# Patient Record
Sex: MALE | Race: WHITE | Employment: PART TIME | URBAN - METROPOLITAN AREA
[De-identification: names, ages, dates, MRNs, and addresses within clinical notes are randomized per-mention and may not be internally consistent; named-entity substitution may affect disease eponyms.]

---

## 2017-08-09 PROBLEM — M65.341 TRIGGER RING FINGER OF RIGHT HAND: Status: ACTIVE | Noted: 2017-08-09

## 2018-07-30 ENCOUNTER — OFFICE VISIT (OUTPATIENT)
Dept: FAMILY MEDICINE CLINIC | Age: 58
End: 2018-07-30
Payer: COMMERCIAL

## 2018-07-30 ENCOUNTER — HOSPITAL ENCOUNTER (OUTPATIENT)
Dept: GENERAL RADIOLOGY | Age: 58
Discharge: HOME | End: 2018-08-01
Payer: COMMERCIAL

## 2018-07-30 VITALS
HEIGHT: 66 IN | SYSTOLIC BLOOD PRESSURE: 138 MMHG | TEMPERATURE: 98.5 F | OXYGEN SATURATION: 98 % | DIASTOLIC BLOOD PRESSURE: 82 MMHG | RESPIRATION RATE: 16 BRPM | HEART RATE: 96 BPM | BODY MASS INDEX: 29.57 KG/M2 | WEIGHT: 184 LBS

## 2018-07-30 DIAGNOSIS — L03.011 CELLULITIS OF FINGER OF RIGHT HAND: ICD-10-CM

## 2018-07-30 DIAGNOSIS — L03.011 CELLULITIS OF FINGER OF RIGHT HAND: Primary | ICD-10-CM

## 2018-07-30 DIAGNOSIS — Z12.11 ENCOUNTER FOR SCREENING COLONOSCOPY: ICD-10-CM

## 2018-07-30 PROCEDURE — G8419 CALC BMI OUT NRM PARAM NOF/U: HCPCS | Performed by: NURSE PRACTITIONER

## 2018-07-30 PROCEDURE — 99213 OFFICE O/P EST LOW 20 MIN: CPT | Performed by: NURSE PRACTITIONER

## 2018-07-30 PROCEDURE — 3017F COLORECTAL CA SCREEN DOC REV: CPT | Performed by: NURSE PRACTITIONER

## 2018-07-30 PROCEDURE — 73140 X-RAY EXAM OF FINGER(S): CPT

## 2018-07-30 PROCEDURE — 1036F TOBACCO NON-USER: CPT | Performed by: NURSE PRACTITIONER

## 2018-07-30 PROCEDURE — G8427 DOCREV CUR MEDS BY ELIG CLIN: HCPCS | Performed by: NURSE PRACTITIONER

## 2018-07-30 RX ORDER — SULFAMETHOXAZOLE AND TRIMETHOPRIM 800; 160 MG/1; MG/1
1 TABLET ORAL 2 TIMES DAILY
Qty: 20 TABLET | Refills: 0 | Status: SHIPPED | OUTPATIENT
Start: 2018-07-30 | End: 2018-08-09

## 2018-07-30 ASSESSMENT — ENCOUNTER SYMPTOMS
CONSTIPATION: 1
VOMITING: 0
WHEEZING: 0
NAUSEA: 0
COUGH: 0
DIARRHEA: 1
SHORTNESS OF BREATH: 0

## 2018-07-30 NOTE — PROGRESS NOTES
HPI:  Patient comes in today for   Chief Complaint   Patient presents with    Finger Pain     right first finger swollen, and red started friday pt is unaware of cause     Referral - General     pt is overdue for colonoscopy would like referred to gastro    . Complains of swelling, redness and pain along cuticle of right index finger. Denies injury. He has not soaked his finger or put any OTC topicals on the area. Symptoms are getting worse. He has had colonoscopy but states it was many years ago and he does not remember who did his colonoscopy. Prior to Visit Medications    Medication Sig Taking? Authorizing Provider   sulfamethoxazole-trimethoprim (BACTRIM DS) 800-160 MG per tablet Take 1 tablet by mouth 2 times daily for 10 days Yes Landen Co, APRN - CNP         No Known Allergies      Review of Systems  Review of Systems   Constitutional: Negative for malaise/fatigue and weight loss. Respiratory: Negative for cough, shortness of breath and wheezing. Cardiovascular: Negative for chest pain and palpitations. Gastrointestinal: Positive for constipation and diarrhea. Negative for nausea and vomiting. Musculoskeletal: Positive for joint pain. Neurological: Negative for dizziness, weakness and headaches. VS:  /82   Pulse 96   Temp 98.5 °F (36.9 °C) (Oral)   Resp 16   Ht 5' 6\" (1.676 m)   Wt 184 lb (83.5 kg)   SpO2 98%   BMI 29.70 kg/m²     Physical Exam  Physical Exam   Constitutional: He is oriented to person, place, and time. He appears well-developed and well-nourished. No distress. HENT:   Head: Normocephalic and atraumatic. Neck: No tracheal deviation present. No thyromegaly present. Cardiovascular: Normal rate, regular rhythm and intact distal pulses. No murmur heard. Pulmonary/Chest: Effort normal and breath sounds normal. He has no wheezes. He has no rales. He exhibits no tenderness. Abdominal: Soft. Bowel sounds are normal. There is no tenderness. Musculoskeletal: He exhibits tenderness. Right index finger redness, swelling and tenderness along cuticle. Lymphadenopathy:     He has no cervical adenopathy. Neurological: He is alert and oriented to person, place, and time. Skin: Skin is warm and dry. Psychiatric: He has a normal mood and affect. His behavior is normal.         Assessment/Plan:  Bebe Hamman was seen today for finger pain and referral - general.    Diagnoses and all orders for this visit:    Cellulitis of finger of right hand  -     sulfamethoxazole-trimethoprim (BACTRIM DS) 800-160 MG per tablet; Take 1 tablet by mouth 2 times daily for 10 days  -     XR FINGER RIGHT (MIN 2 VIEWS);  Future  -epsom salt soaks  -contact office if symptoms do not improve within 48 hours or completely resolve within 2 weeks    Encounter for screening colonoscopy  -     External Referral To Gastroenterology          Greater than 15  Minutes was spent with patient and more than 50% of the time was spent face to face counseling and educating regarding diagnoses

## 2018-12-13 ENCOUNTER — TELEPHONE (OUTPATIENT)
Dept: FAMILY MEDICINE CLINIC | Age: 58
End: 2018-12-13

## 2020-05-19 ENCOUNTER — OFFICE VISIT (OUTPATIENT)
Dept: FAMILY MEDICINE CLINIC | Age: 60
End: 2020-05-19
Payer: COMMERCIAL

## 2020-05-19 VITALS
HEART RATE: 90 BPM | DIASTOLIC BLOOD PRESSURE: 86 MMHG | HEIGHT: 66 IN | BODY MASS INDEX: 28.28 KG/M2 | SYSTOLIC BLOOD PRESSURE: 122 MMHG | RESPIRATION RATE: 14 BRPM | WEIGHT: 176 LBS | OXYGEN SATURATION: 99 % | TEMPERATURE: 98.6 F

## 2020-05-19 PROCEDURE — 1036F TOBACCO NON-USER: CPT | Performed by: FAMILY MEDICINE

## 2020-05-19 PROCEDURE — 3017F COLORECTAL CA SCREEN DOC REV: CPT | Performed by: FAMILY MEDICINE

## 2020-05-19 PROCEDURE — G8427 DOCREV CUR MEDS BY ELIG CLIN: HCPCS | Performed by: FAMILY MEDICINE

## 2020-05-19 PROCEDURE — G8419 CALC BMI OUT NRM PARAM NOF/U: HCPCS | Performed by: FAMILY MEDICINE

## 2020-05-19 PROCEDURE — 99213 OFFICE O/P EST LOW 20 MIN: CPT | Performed by: FAMILY MEDICINE

## 2020-05-19 RX ORDER — MOXIFLOXACIN 5 MG/ML
1 SOLUTION/ DROPS OPHTHALMIC 3 TIMES DAILY
Qty: 3 ML | Refills: 0 | Status: SHIPPED | OUTPATIENT
Start: 2020-05-19 | End: 2020-05-26

## 2020-05-19 RX ORDER — SULFAMETHOXAZOLE AND TRIMETHOPRIM 800; 160 MG/1; MG/1
1 TABLET ORAL 2 TIMES DAILY
Qty: 20 TABLET | Refills: 0 | Status: SHIPPED
Start: 2020-05-19 | End: 2021-01-21 | Stop reason: ALTCHOICE

## 2020-05-19 ASSESSMENT — PATIENT HEALTH QUESTIONNAIRE - PHQ9
2. FEELING DOWN, DEPRESSED OR HOPELESS: 0
SUM OF ALL RESPONSES TO PHQ9 QUESTIONS 1 & 2: 0
SUM OF ALL RESPONSES TO PHQ QUESTIONS 1-9: 0
SUM OF ALL RESPONSES TO PHQ QUESTIONS 1-9: 0
1. LITTLE INTEREST OR PLEASURE IN DOING THINGS: 0

## 2020-05-19 ASSESSMENT — ENCOUNTER SYMPTOMS
WHEEZING: 0
DIARRHEA: 0
BLOOD IN STOOL: 0
CONSTIPATION: 0
BACK PAIN: 0

## 2020-05-19 NOTE — PROGRESS NOTES
HPI:  Patient comes in today for   Chief Complaint   Patient presents with    Facial Swelling     under right eye. began 1 month ago. no pain. has not grown since it appeared. .          Review of Systems  Review of Systems   Constitutional: Negative for chills and diaphoresis. HENT: Negative for ear discharge, ear pain, hearing loss, nosebleeds and tinnitus. Respiratory: Negative for wheezing. Cardiovascular: Negative for chest pain. Gastrointestinal: Negative for blood in stool, constipation and diarrhea. Genitourinary: Negative for dysuria, flank pain and hematuria. Musculoskeletal: Negative for arthralgias and back pain. Skin: Negative for rash. Swelling under right eye starting a month ago. Neurological: Negative for headaches. Hematological: Does not bruise/bleed easily. Psychiatric/Behavioral: Negative for agitation and confusion. PE:  VS:  /86   Pulse 90   Temp 98.6 °F (37 °C) (Temporal)   Resp 14   Ht 5' 6\" (1.676 m)   Wt 176 lb (79.8 kg)   SpO2 99%   BMI 28.41 kg/m²   Physical Exam  Constitutional:       Appearance: He is well-developed. HENT:      Head: Normocephalic and atraumatic. Mouth/Throat:      Mouth: Mucous membranes are moist.   Eyes:      General: No scleral icterus. Conjunctiva/sclera: Conjunctivae normal.      Pupils: Pupils are equal, round, and reactive to light. Neck:      Musculoskeletal: Neck supple. Thyroid: No thyromegaly. Vascular: No JVD. Trachea: No tracheal deviation. Cardiovascular:      Rate and Rhythm: Normal rate and regular rhythm. Heart sounds: Normal heart sounds. No murmur. No gallop. Pulmonary:      Effort: No respiratory distress. Breath sounds: No wheezing. Chest:      Chest wall: No tenderness. Abdominal:      Palpations: There is no mass. Tenderness: There is no abdominal tenderness. There is no rebound. Musculoskeletal:         General: No tenderness.    Skin: Findings: No erythema or rash. Neurological:      Cranial Nerves: No cranial nerve deficit. Coordination: Coordination normal.      Deep Tendon Reflexes: Reflexes normal.      Comments:        Psychiatric:         Mood and Affect: Mood normal.            ASSESSMENT/PLAN:    1. Cellulitis, face  - sulfamethoxazole-trimethoprim (BACTRIM DS) 800-160 MG per tablet; Take 1 tablet by mouth 2 times daily  Dispense: 20 tablet; Refill: 0    2. Acute bacterial sinusitis  - sulfamethoxazole-trimethoprim (BACTRIM DS) 800-160 MG per tablet; Take 1 tablet by mouth 2 times daily  Dispense: 20 tablet; Refill: 0    3. Blepharoconjunctivitis of right eye, unspecified blepharoconjunctivitis type  - moxifloxacin (VIGAMOX) 0.5 % ophthalmic solution; Place 1 drop into the right eye 3 times daily for 7 days  Dispense: 3 mL; Refill: 0               BILLY Garay

## 2020-06-01 ENCOUNTER — TELEPHONE (OUTPATIENT)
Dept: FAMILY MEDICINE CLINIC | Age: 60
End: 2020-06-01

## 2020-08-07 ENCOUNTER — HOSPITAL ENCOUNTER (OUTPATIENT)
Age: 60
Discharge: HOME | End: 2020-08-07
Payer: COMMERCIAL

## 2020-08-07 LAB
CREAT SERPL-MCNC: 1.3 MG/DL (ref 0.7–1.2)
GFR AFRICAN AMERICAN: >60
GFR NON-AFRICAN AMERICAN: 56 ML/MIN/1.73
HCT VFR BLD CALC: 41.9 % (ref 37–54)
HEMOGLOBIN: 14.1 G/DL (ref 12.5–16.5)
MCH RBC QN AUTO: 33 PG (ref 26–35)
MCHC RBC AUTO-ENTMCNC: 33.7 % (ref 32–34.5)
MCV RBC AUTO: 98.1 FL (ref 80–99.9)
PDW BLD-RTO: 12.9 FL (ref 11.5–15)
PLATELET # BLD: 281 E9/L (ref 130–450)
PMV BLD AUTO: 10.5 FL (ref 7–12)
RBC # BLD: 4.27 E12/L (ref 3.8–5.8)
WBC # BLD: 5.5 E9/L (ref 4.5–11.5)

## 2020-08-07 PROCEDURE — 85027 COMPLETE CBC AUTOMATED: CPT

## 2020-08-07 PROCEDURE — 36415 COLL VENOUS BLD VENIPUNCTURE: CPT

## 2020-08-07 PROCEDURE — 82565 ASSAY OF CREATININE: CPT

## 2020-09-22 ENCOUNTER — TELEPHONE (OUTPATIENT)
Dept: FAMILY MEDICINE CLINIC | Age: 60
End: 2020-09-22

## 2020-09-23 NOTE — TELEPHONE ENCOUNTER
Left message for patient to call he needs to be put on the schedule 9/24 @ 8:00.   (ok to double book the spot)

## 2020-09-24 ENCOUNTER — HOSPITAL ENCOUNTER (OUTPATIENT)
Age: 60
Discharge: HOME | End: 2020-09-26
Payer: COMMERCIAL

## 2020-09-24 ENCOUNTER — HOSPITAL ENCOUNTER (OUTPATIENT)
Dept: GENERAL RADIOLOGY | Age: 60
Discharge: HOME | End: 2020-09-26
Payer: COMMERCIAL

## 2020-09-24 ENCOUNTER — OFFICE VISIT (OUTPATIENT)
Dept: FAMILY MEDICINE CLINIC | Age: 60
End: 2020-09-24
Payer: COMMERCIAL

## 2020-09-24 VITALS
SYSTOLIC BLOOD PRESSURE: 112 MMHG | DIASTOLIC BLOOD PRESSURE: 82 MMHG | TEMPERATURE: 97.6 F | HEIGHT: 66 IN | OXYGEN SATURATION: 97 % | WEIGHT: 172 LBS | BODY MASS INDEX: 27.64 KG/M2 | HEART RATE: 101 BPM | RESPIRATION RATE: 16 BRPM

## 2020-09-24 LAB
ALBUMIN SERPL-MCNC: 4.2 G/DL (ref 3.5–5.2)
ALP BLD-CCNC: 95 U/L (ref 40–129)
ALT SERPL-CCNC: 28 U/L (ref 0–40)
ANION GAP SERPL CALCULATED.3IONS-SCNC: 17 MMOL/L (ref 7–16)
AST SERPL-CCNC: 38 U/L (ref 0–39)
BASOPHILS ABSOLUTE: 0.05 E9/L (ref 0–0.2)
BASOPHILS RELATIVE PERCENT: 1.1 % (ref 0–2)
BILIRUB SERPL-MCNC: 0.4 MG/DL (ref 0–1.2)
BUN BLDV-MCNC: 16 MG/DL (ref 8–23)
CALCIUM SERPL-MCNC: 8.7 MG/DL (ref 8.6–10.2)
CHLORIDE BLD-SCNC: 106 MMOL/L (ref 98–107)
CHOLESTEROL, TOTAL: 227 MG/DL (ref 0–199)
CO2: 22 MMOL/L (ref 22–29)
CREAT SERPL-MCNC: 1.1 MG/DL (ref 0.7–1.2)
EOSINOPHILS ABSOLUTE: 0.1 E9/L (ref 0.05–0.5)
EOSINOPHILS RELATIVE PERCENT: 2.3 % (ref 0–6)
GFR AFRICAN AMERICAN: >60
GFR NON-AFRICAN AMERICAN: >60 ML/MIN/1.73
GLUCOSE BLD-MCNC: 91 MG/DL (ref 74–99)
HCT VFR BLD CALC: 45.5 % (ref 37–54)
HDLC SERPL-MCNC: 128 MG/DL
HEMOGLOBIN: 15.1 G/DL (ref 12.5–16.5)
IMMATURE GRANULOCYTES #: 0.02 E9/L
IMMATURE GRANULOCYTES %: 0.5 % (ref 0–5)
LDL CHOLESTEROL CALCULATED: 34 MG/DL (ref 0–99)
LYMPHOCYTES ABSOLUTE: 1.12 E9/L (ref 1.5–4)
LYMPHOCYTES RELATIVE PERCENT: 25.7 % (ref 20–42)
MCH RBC QN AUTO: 34.3 PG (ref 26–35)
MCHC RBC AUTO-ENTMCNC: 33.2 % (ref 32–34.5)
MCV RBC AUTO: 103.4 FL (ref 80–99.9)
MONOCYTES ABSOLUTE: 0.38 E9/L (ref 0.1–0.95)
MONOCYTES RELATIVE PERCENT: 8.7 % (ref 2–12)
NEUTROPHILS ABSOLUTE: 2.68 E9/L (ref 1.8–7.3)
NEUTROPHILS RELATIVE PERCENT: 61.7 % (ref 43–80)
PDW BLD-RTO: 14.9 FL (ref 11.5–15)
PLATELET # BLD: 212 E9/L (ref 130–450)
PMV BLD AUTO: 10.5 FL (ref 7–12)
POTASSIUM SERPL-SCNC: 4.2 MMOL/L (ref 3.5–5)
PROSTATE SPECIFIC ANTIGEN: 0.53 NG/ML (ref 0–4)
RBC # BLD: 4.4 E12/L (ref 3.8–5.8)
SODIUM BLD-SCNC: 145 MMOL/L (ref 132–146)
TOTAL PROTEIN: 7.1 G/DL (ref 6.4–8.3)
TRIGL SERPL-MCNC: 323 MG/DL (ref 0–149)
VLDLC SERPL CALC-MCNC: 65 MG/DL
WBC # BLD: 4.4 E9/L (ref 4.5–11.5)

## 2020-09-24 PROCEDURE — 3017F COLORECTAL CA SCREEN DOC REV: CPT | Performed by: FAMILY MEDICINE

## 2020-09-24 PROCEDURE — 80053 COMPREHEN METABOLIC PANEL: CPT

## 2020-09-24 PROCEDURE — G8419 CALC BMI OUT NRM PARAM NOF/U: HCPCS | Performed by: FAMILY MEDICINE

## 2020-09-24 PROCEDURE — 80061 LIPID PANEL: CPT

## 2020-09-24 PROCEDURE — 71046 X-RAY EXAM CHEST 2 VIEWS: CPT

## 2020-09-24 PROCEDURE — G0103 PSA SCREENING: HCPCS

## 2020-09-24 PROCEDURE — 85025 COMPLETE CBC W/AUTO DIFF WBC: CPT

## 2020-09-24 PROCEDURE — G8427 DOCREV CUR MEDS BY ELIG CLIN: HCPCS | Performed by: FAMILY MEDICINE

## 2020-09-24 PROCEDURE — 1036F TOBACCO NON-USER: CPT | Performed by: FAMILY MEDICINE

## 2020-09-24 PROCEDURE — 99214 OFFICE O/P EST MOD 30 MIN: CPT | Performed by: FAMILY MEDICINE

## 2020-09-24 ASSESSMENT — ENCOUNTER SYMPTOMS
ABDOMINAL DISTENTION: 0
WHEEZING: 0
BLOOD IN STOOL: 0
EYE DISCHARGE: 1
BACK PAIN: 0
APNEA: 0
CONSTIPATION: 0
DIARRHEA: 0

## 2020-09-24 NOTE — PROGRESS NOTES
HPI:  Patient comes in today for   Chief Complaint   Patient presents with    Facial Swelling     Patient presents with eye swelling. Dr. Carmel Camejo at [facility] biopsied and found it cancerous. PAtient needs a plan of action for the cancer. He had a swollen lower eye lid then was treated with antibiotics. It did not respond he delayed and saw his Ophthalmologist, who referred him to oculo plastics. Excision revealed Adenocarcinoma. So at this time we need a search for the primary. He comes to office with his brother Erlin Black. Review of Systems  Review of Systems   Constitutional: Negative for chills and diaphoresis. Denies weight loss, no blood in stool. HENT: Negative for ear discharge, ear pain, hearing loss, nosebleeds and tinnitus. Small right neck node. Eyes: Positive for discharge. Right eyelid puffy,    Respiratory: Negative for apnea and wheezing. Cardiovascular: Negative for chest pain. Gastrointestinal: Negative for abdominal distention, blood in stool, constipation and diarrhea. Genitourinary: Negative for dysuria, flank pain and hematuria. Musculoskeletal: Negative for arthralgias and back pain. Skin: Negative for rash. Neurological: Negative for dizziness and headaches. Hematological: Negative for adenopathy. Does not bruise/bleed easily. Psychiatric/Behavioral: Negative for agitation and confusion. PE[de-identified]  /82   Pulse 101   Temp 97.6 °F (36.4 °C) (Temporal)   Resp 16   Ht 5' 6\" (1.676 m)   Wt 172 lb (78 kg)   SpO2 97%   BMI 27.76 kg/m²       Physical Exam  Constitutional:       Appearance: Normal appearance. He is well-developed. HENT:      Head: Normocephalic and atraumatic. Right Ear: Tympanic membrane normal.      Nose: Nose normal.      Mouth/Throat:      Mouth: Mucous membranes are moist.   Eyes:      General: No scleral icterus.      Conjunctiva/sclera: Conjunctivae normal.      Pupils: Pupils are equal, round, and reactive to light. Neck:      Musculoskeletal: Neck supple. Thyroid: No thyromegaly. Vascular: No JVD. Trachea: No tracheal deviation. Cardiovascular:      Rate and Rhythm: Normal rate and regular rhythm. Heart sounds: Normal heart sounds. No murmur. No gallop. Pulmonary:      Effort: Pulmonary effort is normal. No respiratory distress. Breath sounds: Normal breath sounds. No wheezing. Abdominal:      Palpations: Abdomen is soft. Musculoskeletal:         General: No tenderness. Skin:     General: Skin is warm. Capillary Refill: Capillary refill takes less than 2 seconds. Findings: No erythema or rash. Comments: Edema right lower lid   Neurological:      General: No focal deficit present. Mental Status: He is alert and oriented to person, place, and time. Deep Tendon Reflexes: Reflexes normal.      Comments:                Assessment/Plan:  Tricia Diggs was seen today for facial swelling. Diagnoses and all orders for this visit:    Metastatic adenocarcinoma ([facility] [address]  -     XR CHEST (2 VW); Future  -     CBC Auto Differential; Future  -     Comprehensive Metabolic Panel; Future  -     Lipid Panel; Future  -     Psa screening; Future  -     Shireen Cabral MD, Hematology and Oncology, [place] ([facility])  -     CT CHEST WO CONTRAST; Future  -     CT ABDOMEN PELVIS W WO CONTRAST Additional Contrast? Oral; Future          BILLY Coy.

## 2020-09-25 ENCOUNTER — TELEPHONE (OUTPATIENT)
Dept: FAMILY MEDICINE CLINIC | Age: 60
End: 2020-09-25

## 2020-09-27 ENCOUNTER — HOSPITAL ENCOUNTER (OUTPATIENT)
Dept: CT IMAGING | Age: 60
Discharge: HOME | End: 2020-09-29
Payer: COMMERCIAL

## 2020-09-27 PROCEDURE — 2580000003 HC RX 258: Performed by: RADIOLOGY

## 2020-09-27 PROCEDURE — 74178 CT ABD&PLV WO CNTR FLWD CNTR: CPT

## 2020-09-27 PROCEDURE — 6360000004 HC RX CONTRAST MEDICATION: Performed by: RADIOLOGY

## 2020-09-27 PROCEDURE — 71250 CT THORAX DX C-: CPT

## 2020-09-27 RX ORDER — SODIUM CHLORIDE 0.9 % (FLUSH) 0.9 %
10 SYRINGE (ML) INJECTION
Status: COMPLETED | OUTPATIENT
Start: 2020-09-27 | End: 2020-09-27

## 2020-09-27 RX ADMIN — IOHEXOL 50 ML: 240 INJECTION, SOLUTION INTRATHECAL; INTRAVASCULAR; INTRAVENOUS; ORAL at 09:44

## 2020-09-27 RX ADMIN — Medication 10 ML: at 09:44

## 2020-09-27 RX ADMIN — IOPAMIDOL 110 ML: 755 INJECTION, SOLUTION INTRAVENOUS at 09:44

## 2020-10-12 ENCOUNTER — HOSPITAL ENCOUNTER (OUTPATIENT)
Dept: PET IMAGING | Age: 60
Discharge: HOME | End: 2020-10-14
Payer: COMMERCIAL

## 2020-10-12 LAB — METER GLUCOSE: 89 MG/DL (ref 74–99)

## 2020-10-12 PROCEDURE — 3430000000 HC RX DIAGNOSTIC RADIOPHARMACEUTICAL: Performed by: RADIOLOGY

## 2020-10-12 PROCEDURE — A9552 F18 FDG: HCPCS | Performed by: RADIOLOGY

## 2020-10-12 PROCEDURE — 82962 GLUCOSE BLOOD TEST: CPT

## 2020-10-12 PROCEDURE — 78815 PET IMAGE W/CT SKULL-THIGH: CPT

## 2020-10-12 RX ORDER — FLUDEOXYGLUCOSE F 18 200 MCI/ML
15 INJECTION, SOLUTION INTRAVENOUS
Status: COMPLETED | OUTPATIENT
Start: 2020-10-12 | End: 2020-10-12

## 2020-10-12 RX ADMIN — FLUDEOXYGLUCOSE F 18 15 MILLICURIE: 200 INJECTION, SOLUTION INTRAVENOUS at 09:36

## 2020-10-22 ENCOUNTER — PREP FOR PROCEDURE (OUTPATIENT)
Dept: GENERAL RADIOLOGY | Age: 60
End: 2020-10-22

## 2020-10-30 ENCOUNTER — HOSPITAL ENCOUNTER (OUTPATIENT)
Dept: MRI IMAGING | Age: 60
Discharge: HOME | End: 2020-11-01
Payer: COMMERCIAL

## 2020-10-30 PROCEDURE — 70543 MRI ORBT/FAC/NCK W/O &W/DYE: CPT

## 2020-10-30 PROCEDURE — 6360000004 HC RX CONTRAST MEDICATION: Performed by: RADIOLOGY

## 2020-10-30 PROCEDURE — A9579 GAD-BASE MR CONTRAST NOS,1ML: HCPCS | Performed by: RADIOLOGY

## 2020-10-30 RX ADMIN — GADOTERIDOL 16 ML: 279.3 INJECTION, SOLUTION INTRAVENOUS at 16:18

## 2020-11-03 DIAGNOSIS — Z01.818 PRE-OP TESTING: Primary | ICD-10-CM

## 2020-11-04 ENCOUNTER — HOSPITAL ENCOUNTER (OUTPATIENT)
Age: 60
Discharge: HOME | End: 2020-11-06
Payer: COMMERCIAL

## 2020-11-04 PROCEDURE — U0003 INFECTIOUS AGENT DETECTION BY NUCLEIC ACID (DNA OR RNA); SEVERE ACUTE RESPIRATORY SYNDROME CORONAVIRUS 2 (SARS-COV-2) (CORONAVIRUS DISEASE [COVID-19]), AMPLIFIED PROBE TECHNIQUE, MAKING USE OF HIGH THROUGHPUT TECHNOLOGIES AS DESCRIBED BY CMS-2020-01-R: HCPCS

## 2020-11-05 LAB
SARS-COV-2: NOT DETECTED
SOURCE: NORMAL

## 2021-01-01 ENCOUNTER — HOSPITAL ENCOUNTER (EMERGENCY)
Age: 61
Discharge: HOME | End: 2021-01-01
Attending: EMERGENCY MEDICINE
Payer: MEDICARE

## 2021-01-01 VITALS
DIASTOLIC BLOOD PRESSURE: 88 MMHG | SYSTOLIC BLOOD PRESSURE: 141 MMHG | HEIGHT: 65 IN | OXYGEN SATURATION: 98 % | TEMPERATURE: 98.1 F | BODY MASS INDEX: 28.32 KG/M2 | RESPIRATION RATE: 18 BRPM | HEART RATE: 97 BPM | WEIGHT: 170 LBS

## 2021-01-01 DIAGNOSIS — R21 RASH AND OTHER NONSPECIFIC SKIN ERUPTION: Primary | ICD-10-CM

## 2021-01-01 LAB
ALBUMIN SERPL-MCNC: 3.7 G/DL (ref 3.5–5.2)
ALP BLD-CCNC: 81 U/L (ref 40–129)
ALT SERPL-CCNC: 17 U/L (ref 0–40)
ANION GAP SERPL CALCULATED.3IONS-SCNC: 12 MMOL/L (ref 7–16)
APTT: 28.6 SEC (ref 24.5–35.1)
AST SERPL-CCNC: 14 U/L (ref 0–39)
BILIRUB SERPL-MCNC: 0.2 MG/DL (ref 0–1.2)
BUN BLDV-MCNC: 10 MG/DL (ref 8–23)
CALCIUM SERPL-MCNC: 8.8 MG/DL (ref 8.6–10.2)
CHLORIDE BLD-SCNC: 104 MMOL/L (ref 98–107)
CO2: 23 MMOL/L (ref 22–29)
CREAT SERPL-MCNC: 1.1 MG/DL (ref 0.7–1.2)
GFR AFRICAN AMERICAN: >60
GFR NON-AFRICAN AMERICAN: >60 ML/MIN/1.73
GLUCOSE BLD-MCNC: 139 MG/DL (ref 74–99)
HCT VFR BLD CALC: 37.7 % (ref 37–54)
HEMOGLOBIN: 12.6 G/DL (ref 12.5–16.5)
INR BLD: 1
MCH RBC QN AUTO: 32.4 PG (ref 26–35)
MCHC RBC AUTO-ENTMCNC: 33.4 % (ref 32–34.5)
MCV RBC AUTO: 96.9 FL (ref 80–99.9)
PDW BLD-RTO: 13.2 FL (ref 11.5–15)
PLATELET # BLD: 271 E9/L (ref 130–450)
PMV BLD AUTO: 10.6 FL (ref 7–12)
POTASSIUM SERPL-SCNC: 4.3 MMOL/L (ref 3.5–5)
PROTHROMBIN TIME: 11.4 SEC (ref 9.3–12.4)
RBC # BLD: 3.89 E12/L (ref 3.8–5.8)
SODIUM BLD-SCNC: 139 MMOL/L (ref 132–146)
TOTAL PROTEIN: 7.1 G/DL (ref 6.4–8.3)
WBC # BLD: 6.5 E9/L (ref 4.5–11.5)

## 2021-01-01 PROCEDURE — 6360000002 HC RX W HCPCS: Performed by: NURSE PRACTITIONER

## 2021-01-01 PROCEDURE — 36415 COLL VENOUS BLD VENIPUNCTURE: CPT

## 2021-01-01 PROCEDURE — 2500000003 HC RX 250 WO HCPCS: Performed by: NURSE PRACTITIONER

## 2021-01-01 PROCEDURE — 85027 COMPLETE CBC AUTOMATED: CPT

## 2021-01-01 PROCEDURE — 85610 PROTHROMBIN TIME: CPT

## 2021-01-01 PROCEDURE — 96372 THER/PROPH/DIAG INJ SC/IM: CPT

## 2021-01-01 PROCEDURE — 85730 THROMBOPLASTIN TIME PARTIAL: CPT

## 2021-01-01 PROCEDURE — 99284 EMERGENCY DEPT VISIT MOD MDM: CPT

## 2021-01-01 PROCEDURE — 96374 THER/PROPH/DIAG INJ IV PUSH: CPT

## 2021-01-01 PROCEDURE — 96375 TX/PRO/DX INJ NEW DRUG ADDON: CPT

## 2021-01-01 PROCEDURE — 2580000003 HC RX 258: Performed by: NURSE PRACTITIONER

## 2021-01-01 PROCEDURE — 80053 COMPREHEN METABOLIC PANEL: CPT

## 2021-01-01 RX ORDER — FAMOTIDINE 20 MG/1
20 TABLET, FILM COATED ORAL 2 TIMES DAILY
Qty: 14 TABLET | Refills: 0 | Status: SHIPPED | OUTPATIENT
Start: 2021-01-01 | End: 2021-07-16 | Stop reason: ALTCHOICE

## 2021-01-01 RX ORDER — 0.9 % SODIUM CHLORIDE 0.9 %
1000 INTRAVENOUS SOLUTION INTRAVENOUS ONCE
Status: COMPLETED | OUTPATIENT
Start: 2021-01-01 | End: 2021-01-01

## 2021-01-01 RX ORDER — HYDROXYZINE PAMOATE 25 MG/1
25 CAPSULE ORAL 3 TIMES DAILY PRN
Qty: 21 CAPSULE | Refills: 0 | Status: SHIPPED | OUTPATIENT
Start: 2021-01-01 | End: 2021-01-15

## 2021-01-01 RX ORDER — PREDNISONE 10 MG/1
TABLET ORAL
Qty: 30 TABLET | Refills: 0 | Status: SHIPPED | OUTPATIENT
Start: 2021-01-01 | End: 2021-01-11

## 2021-01-01 RX ORDER — METHYLPREDNISOLONE SODIUM SUCCINATE 125 MG/2ML
125 INJECTION, POWDER, LYOPHILIZED, FOR SOLUTION INTRAMUSCULAR; INTRAVENOUS ONCE
Status: COMPLETED | OUTPATIENT
Start: 2021-01-01 | End: 2021-01-01

## 2021-01-01 RX ORDER — HYDROXYZINE HYDROCHLORIDE 50 MG/ML
50 INJECTION, SOLUTION INTRAMUSCULAR ONCE
Status: COMPLETED | OUTPATIENT
Start: 2021-01-01 | End: 2021-01-01

## 2021-01-01 RX ADMIN — FAMOTIDINE 20 MG: 10 INJECTION INTRAVENOUS at 14:52

## 2021-01-01 RX ADMIN — METHYLPREDNISOLONE SODIUM SUCCINATE 125 MG: 125 INJECTION, POWDER, FOR SOLUTION INTRAMUSCULAR; INTRAVENOUS at 14:52

## 2021-01-01 RX ADMIN — HYDROXYZINE HYDROCHLORIDE 50 MG: 50 INJECTION, SOLUTION INTRAMUSCULAR at 14:52

## 2021-01-01 RX ADMIN — SODIUM CHLORIDE 1000 ML: 9 INJECTION, SOLUTION INTRAVENOUS at 14:52

## 2021-01-01 ASSESSMENT — PAIN SCALES - GENERAL: PAINLEVEL_OUTOF10: 2

## 2021-01-01 ASSESSMENT — PAIN DESCRIPTION - DESCRIPTORS: DESCRIPTORS: ITCHING

## 2021-01-01 NOTE — ED NOTES
Pt presented for a widespread body rash which is red and itches. Pt states the rash started on his arm and spreaded throughout his body. Pt is tachycardic on the moniter. Pt reports recent treatment for a cancer of his [facility] which lead to him having to have his eye removed. Pt denies new soaps and skin products. Pt denies any fever or viral smptoms. WCTM.       Gustavo Kirby RN  01/01/21 1424

## 2021-01-03 NOTE — ED PROVIDER NOTES
Height Weight   01/01/21 1406 01/01/21 1402 01/01/21 1600 01/01/21 1402 01/01/21 1406 01/01/21 1402 01/01/21 1406 01/01/21 1406   (!) 160/104 97.7 °F (36.5 °C) Oral 127 16 97 % 5' 5\" (1.651 m) 170 lb (77.1 kg)         Constitutional:  Alert, development consistent with age. HEENT:  NC/NT. Airway patent. Eyes:  PERRL, EOMI, no discharge. Ears:  TMs without perforation, injection, or bulging. External canals clear without exudate. Mouth:  Mucous membranes moist without lesions, tongue and gums normal.  Throat:  Pharynx without injection, exudate, or tonsillar hypertrophy. Airway patient. Neck:  Supple. No lymphadenopathy. Respiratory:  Clear to auscultation and breath sounds equal.  CV:  Regular rate and rhythm. GI:  Abdomen Soft, nontender, +BS. Integument:  Skin turgor: Normal.  Grouped erythematous papules noted to trunk bilateral upper and lower extremities. Neurological:  Orientation age-appropriate unless noted elseware. Motor functions intact.     Lab / Imaging Results   (All laboratory and radiology results have been personally reviewed by myself)  Labs:  Results for orders placed or performed during the hospital encounter of 01/01/21   CBC   Result Value Ref Range    WBC 6.5 4.5 - 11.5 E9/L    RBC 3.89 3.80 - 5.80 E12/L    Hemoglobin 12.6 12.5 - 16.5 g/dL    Hematocrit 37.7 37.0 - 54.0 %    MCV 96.9 80.0 - 99.9 fL    MCH 32.4 26.0 - 35.0 pg    MCHC 33.4 32.0 - 34.5 %    RDW 13.2 11.5 - 15.0 fL    Platelets 116 440 - 468 E9/L    MPV 10.6 7.0 - 12.0 fL   Comprehensive Metabolic Panel   Result Value Ref Range    Sodium 139 132 - 146 mmol/L    Potassium 4.3 3.5 - 5.0 mmol/L    Chloride 104 98 - 107 mmol/L    CO2 23 22 - 29 mmol/L    Anion Gap 12 7 - 16 mmol/L    Glucose 139 (H) 74 - 99 mg/dL    BUN 10 8 - 23 mg/dL    CREATININE 1.1 0.7 - 1.2 mg/dL    GFR Non-African American >60 >=60 mL/min/1.73    GFR African American >60     Calcium 8.8 8.6 - 10.2 mg/dL    Total Protein 7.1 6.4 - 8.3 g/dL    Alb 3.7 3.5 - 5.2 g/dL    Total Bilirubin 0.2 0.0 - 1.2 mg/dL    Alkaline Phosphatase 81 40 - 129 U/L    ALT 17 0 - 40 U/L    AST 14 0 - 39 U/L   APTT   Result Value Ref Range    aPTT 28.6 24.5 - 35.1 sec   Protime-INR   Result Value Ref Range    Protime 11.4 9.3 - 12.4 sec    INR 1.0        Imaging: All Radiology results interpreted by Radiologist unless otherwise noted. No orders to display       ED Course / Medical Decision Making     Medications   0.9 % sodium chloride bolus (0 mLs Intravenous Stopped 1/1/21 1610)   hydrOXYzine (VISTARIL) injection 50 mg (50 mg Intramuscular Given 1/1/21 1452)   famotidine (PEPCID) injection 20 mg (20 mg Intravenous Given 1/1/21 1452)   methylPREDNISolone sodium (SOLU-MEDROL) injection 125 mg (125 mg Intravenous Given 1/1/21 1452)        Consults:   None    Procedures:   none    MDM:   Patient is well-appearing, afebrile. Vital signs stable. Labs obtained, reviewed, reassuring. He was given IV fluids, Vistaril Pepcid and Solu-Medrol. No difficulty breathing or swallowing. Plan is for symptom control and appropriate outpatient follow-up with PCP for recheck. He was educated on signs and symptoms which require emergent evaluation. Plan of Care/Counseling:  Myself reviewed today's visit with the patient in addition to providing specific details for the plan of care and counseling regarding the diagnosis and prognosis. Questions are answered at this time and are agreeable with the plan. Assessment      1. Rash and other nonspecific skin eruption      Plan   Discharge to home.   Patient condition is good    New Medications     Discharge Medication List as of 1/1/2021  3:34 PM      START taking these medications    Details   predniSONE (DELTASONE) 10 MG tablet 5 tablets by mouth once a day x 2 days, then 4 tablets by mouth once a day x 2 days, then 3 tablets by mouth once a day x 2 days, then 2 tablets by mouth once a day x 2 days, then 1 tablets by mouth once a day x 2 days

## 2021-01-21 ENCOUNTER — OFFICE VISIT (OUTPATIENT)
Dept: FAMILY MEDICINE CLINIC | Age: 61
End: 2021-01-21
Payer: MEDICARE

## 2021-01-21 VITALS
HEIGHT: 65 IN | TEMPERATURE: 97.6 F | OXYGEN SATURATION: 98 % | WEIGHT: 180 LBS | BODY MASS INDEX: 29.99 KG/M2 | RESPIRATION RATE: 14 BRPM | SYSTOLIC BLOOD PRESSURE: 114 MMHG | HEART RATE: 98 BPM | DIASTOLIC BLOOD PRESSURE: 76 MMHG

## 2021-01-21 DIAGNOSIS — C69.51: ICD-10-CM

## 2021-01-21 DIAGNOSIS — C80.1 CANCER (HCC): Primary | ICD-10-CM

## 2021-01-21 PROCEDURE — G8419 CALC BMI OUT NRM PARAM NOF/U: HCPCS | Performed by: FAMILY MEDICINE

## 2021-01-21 PROCEDURE — 99214 OFFICE O/P EST MOD 30 MIN: CPT | Performed by: FAMILY MEDICINE

## 2021-01-21 PROCEDURE — 1036F TOBACCO NON-USER: CPT | Performed by: FAMILY MEDICINE

## 2021-01-21 PROCEDURE — 3017F COLORECTAL CA SCREEN DOC REV: CPT | Performed by: FAMILY MEDICINE

## 2021-01-21 PROCEDURE — G8427 DOCREV CUR MEDS BY ELIG CLIN: HCPCS | Performed by: FAMILY MEDICINE

## 2021-01-21 PROCEDURE — G8484 FLU IMMUNIZE NO ADMIN: HCPCS | Performed by: FAMILY MEDICINE

## 2021-01-21 ASSESSMENT — PATIENT HEALTH QUESTIONNAIRE - PHQ9
SUM OF ALL RESPONSES TO PHQ QUESTIONS 1-9: 0
1. LITTLE INTEREST OR PLEASURE IN DOING THINGS: 0
SUM OF ALL RESPONSES TO PHQ9 QUESTIONS 1 & 2: 0
SUM OF ALL RESPONSES TO PHQ QUESTIONS 1-9: 0
2. FEELING DOWN, DEPRESSED OR HOPELESS: 0
SUM OF ALL RESPONSES TO PHQ QUESTIONS 1-9: 0

## 2021-01-21 ASSESSMENT — ENCOUNTER SYMPTOMS
BLOOD IN STOOL: 0
CONSTIPATION: 0
WHEEZING: 0
DIARRHEA: 0

## 2021-01-21 NOTE — PROGRESS NOTES
Denice Buck (:  1960) is a 63 y.o. male,Established patient, here for evaluation of the following chief complaint(s): Other (needs disability verification form filled out for housing. he is also requesting a handicap placard script. ) and Health Maintenance (unsure about flu vaccine)      ASSESSMENT/PLAN:  1. Cancer ([address]  -     Handicap Placard MISC; Starting Thu 2021, Disp-1 each, R-0, PrintUnable to ambulate more than 20 feet without difficulty Expires 2026  2. Adenocarcinoma of right lacrimal gland [facility])      He has the hobby of wire art. SUBJECTIVE/OBJECTIVE:  Atul Aguilera is here for evaluation of the following chief complaint(s): Other (needs disability verification form filled out for housing. he is also requesting a handicap placard script. ) and Health Maintenance (unsure about flu vaccine)        Review of Systems   Constitutional: Negative for chills and diaphoresis. HENT: Negative for ear discharge, ear pain, hearing loss, nosebleeds and tinnitus. Respiratory: Negative for wheezing. Cardiovascular: Negative for chest pain. Gastrointestinal: Negative for blood in stool, constipation and diarrhea. Genitourinary: Negative for dysuria, flank pain and hematuria. Musculoskeletal: Positive for arthralgias. Skin: Negative for rash. Neurological: Negative for headaches. Hematological: Does not bruise/bleed easily. Physical Exam  HENT:      Nose:      Comments: There is no CSF rhinorrhea at this time. It was present but has now resolved. Eyes:      General:         Right eye: No discharge. Left eye: No discharge. Comments: OD is exenterated and a myocutaneous flap is in place and well healed as is the right thigh anterior harvest site. There was some neuropathic change but much better now. Neck:      Musculoskeletal: No neck rigidity. Cardiovascular:      Rate and Rhythm: Normal rate and regular rhythm. Heart sounds: No murmur. Pulmonary:      Effort: Pulmonary effort is normal. No respiratory distress. Breath sounds: No rhonchi or rales. Abdominal:      Palpations: Abdomen is soft. Tenderness: There is no abdominal tenderness. Musculoskeletal: Normal range of motion. Skin:     General: Skin is warm. Capillary Refill: Capillary refill takes less than 2 seconds. Coloration: Skin is not pale. Findings: No bruising. Neurological:      Mental Status: He is alert. Psychiatric:         Mood and Affect: Mood normal.     All [facility] records and Tx plans reviewed with patient. On this date 01/21/21 I have spent 20 minutes reviewing previous notes, test results and face to face with the patient discussing the diagnosis and importance of compliance with the treatment plan as well as documenting on the day of the visit. He is to go on now to [facility] for Chemotx and IGRT for nect 1-2 months. An electronic signature was used to authenticate this note.     --Amada Ortiz DO

## 2021-07-16 ENCOUNTER — APPOINTMENT (OUTPATIENT)
Dept: ULTRASOUND IMAGING | Age: 61
End: 2021-07-16
Payer: MEDICARE

## 2021-07-16 ENCOUNTER — HOSPITAL ENCOUNTER (EMERGENCY)
Age: 61
Discharge: HOME | End: 2021-07-16
Payer: MEDICARE

## 2021-07-16 VITALS
HEIGHT: 66 IN | SYSTOLIC BLOOD PRESSURE: 124 MMHG | DIASTOLIC BLOOD PRESSURE: 71 MMHG | RESPIRATION RATE: 17 BRPM | TEMPERATURE: 97.4 F | WEIGHT: 170 LBS | HEART RATE: 84 BPM | BODY MASS INDEX: 27.32 KG/M2 | OXYGEN SATURATION: 98 %

## 2021-07-16 DIAGNOSIS — N43.3 HYDROCELE, BILATERAL: Primary | ICD-10-CM

## 2021-07-16 DIAGNOSIS — N50.811 PAIN IN RIGHT TESTICLE: ICD-10-CM

## 2021-07-16 LAB
ALBUMIN SERPL-MCNC: 4.4 G/DL (ref 3.5–5.2)
ALP BLD-CCNC: 73 U/L (ref 40–129)
ALT SERPL-CCNC: 20 U/L (ref 0–40)
ANION GAP SERPL CALCULATED.3IONS-SCNC: 11 MMOL/L (ref 7–16)
AST SERPL-CCNC: 19 U/L (ref 0–39)
BASOPHILS ABSOLUTE: 0.03 E9/L (ref 0–0.2)
BASOPHILS RELATIVE PERCENT: 0.5 % (ref 0–2)
BILIRUB SERPL-MCNC: 0.4 MG/DL (ref 0–1.2)
BILIRUBIN URINE: NEGATIVE
BLOOD, URINE: NEGATIVE
BUN BLDV-MCNC: 20 MG/DL (ref 6–23)
CALCIUM SERPL-MCNC: 9.3 MG/DL (ref 8.6–10.2)
CHLORIDE BLD-SCNC: 102 MMOL/L (ref 98–107)
CLARITY: CLEAR
CO2: 25 MMOL/L (ref 22–29)
COLOR: YELLOW
CREAT SERPL-MCNC: 1.4 MG/DL (ref 0.7–1.2)
EOSINOPHILS ABSOLUTE: 0.13 E9/L (ref 0.05–0.5)
EOSINOPHILS RELATIVE PERCENT: 2 % (ref 0–6)
GFR AFRICAN AMERICAN: >60
GFR NON-AFRICAN AMERICAN: 51 ML/MIN/1.73
GLUCOSE BLD-MCNC: 96 MG/DL (ref 74–99)
GLUCOSE URINE: NEGATIVE MG/DL
HCT VFR BLD CALC: 42 % (ref 37–54)
HEMOGLOBIN: 13.5 G/DL (ref 12.5–16.5)
IMMATURE GRANULOCYTES #: 0.03 E9/L
IMMATURE GRANULOCYTES %: 0.5 % (ref 0–5)
KETONES, URINE: NEGATIVE MG/DL
LACTIC ACID, SEPSIS: 0.8 MMOL/L (ref 0.5–1.9)
LEUKOCYTE ESTERASE, URINE: NEGATIVE
LYMPHOCYTES ABSOLUTE: 0.9 E9/L (ref 1.5–4)
LYMPHOCYTES RELATIVE PERCENT: 13.9 % (ref 20–42)
MCH RBC QN AUTO: 31.7 PG (ref 26–35)
MCHC RBC AUTO-ENTMCNC: 32.1 % (ref 32–34.5)
MCV RBC AUTO: 98.6 FL (ref 80–99.9)
MONOCYTES ABSOLUTE: 0.5 E9/L (ref 0.1–0.95)
MONOCYTES RELATIVE PERCENT: 7.7 % (ref 2–12)
NEUTROPHILS ABSOLUTE: 4.87 E9/L (ref 1.8–7.3)
NEUTROPHILS RELATIVE PERCENT: 75.4 % (ref 43–80)
NITRITE, URINE: NEGATIVE
PDW BLD-RTO: 12.6 FL (ref 11.5–15)
PH UA: 6 (ref 5–9)
PLATELET # BLD: 242 E9/L (ref 130–450)
PMV BLD AUTO: 10.3 FL (ref 7–12)
POTASSIUM REFLEX MAGNESIUM: 4 MMOL/L (ref 3.5–5)
PROTEIN UA: NEGATIVE MG/DL
RBC # BLD: 4.26 E12/L (ref 3.8–5.8)
SODIUM BLD-SCNC: 138 MMOL/L (ref 132–146)
SPECIFIC GRAVITY UA: 1.02 (ref 1–1.03)
TOTAL PROTEIN: 7.6 G/DL (ref 6.4–8.3)
UROBILINOGEN, URINE: 0.2 E.U./DL
WBC # BLD: 6.5 E9/L (ref 4.5–11.5)

## 2021-07-16 PROCEDURE — 85025 COMPLETE CBC W/AUTO DIFF WBC: CPT

## 2021-07-16 PROCEDURE — 81003 URINALYSIS AUTO W/O SCOPE: CPT

## 2021-07-16 PROCEDURE — 76870 US EXAM SCROTUM: CPT

## 2021-07-16 PROCEDURE — 80053 COMPREHEN METABOLIC PANEL: CPT

## 2021-07-16 PROCEDURE — 83605 ASSAY OF LACTIC ACID: CPT

## 2021-07-16 PROCEDURE — 99283 EMERGENCY DEPT VISIT LOW MDM: CPT

## 2021-07-16 PROCEDURE — 93975 VASCULAR STUDY: CPT

## 2021-07-16 ASSESSMENT — PAIN DESCRIPTION - PAIN TYPE: TYPE: ACUTE PAIN

## 2021-07-16 ASSESSMENT — PAIN SCALES - GENERAL: PAINLEVEL_OUTOF10: 4

## 2021-07-16 ASSESSMENT — PAIN DESCRIPTION - LOCATION: LOCATION: SCROTUM

## 2021-07-16 NOTE — ED PROVIDER NOTES
[address]  Department of Emergency Medicine   ED  Encounter Note  Admit Date/RoomTime: 2021  5:20 PM  ED Room: 38/38    NAME: Rodrigo Ji  : 1960  MRN: [id number]     Chief Complaint:  Groin Pain (tesicular pain, right)    History of Present Illness       Rodrigo Ji is a 63 y.o. old male who presenting to the emergency department by private vehicle, for sudden onset of Right scrotal pain , which occured 1 day(s) prior to arrival.  Since exposure/onset the symptoms have been persistent and mild in severity. He has associated symptoms of nothing pertinent. Patient states that \"I had right skin cancer that affected my eye and had to have my I removed a while ago with radiation to concern me because it is on the right side. \"  Patient describes his right testicle as a heaviness and only hurts when I \"squeeze it. \"  Patient states nothing seems to make it better. Patient has not seen a urologist. He denies any abdominal pain, back pain, chills, cloudy urine, constipation, diarrhea, dysuria, headache, hematuria, penile discharge, sweating, urinary frequency, urinary incontinence or urinary urgency. There has been no history of similar episodes of pain. There has been no history of recent trauma. STD history: none. Patient has no concern for an STD. Patient denies any fever or chills. ROS   Pertinent positives and negatives are stated within HPI, all other systems reviewed and are negative. Past Medical History:  has a past medical history of Cancer ([address] Diverticulitis, and Multiple closed pelvic fractures without disruption of pelvic ring ([address] Surgical History:  has a past surgical history that includes Spinal fusion; Colonoscopy; back surgery; laparoscopy (2012); Abdomen surgery; hemicolectomy (12); and other surgical history (Right, 2016). Social History:  reports that he has never smoked.  He has never used smokeless tobacco. He reports current alcohol use. He reports that he does not use drugs. Family History: family history is not on file. Allergies: Patient has no known allergies. Physical Exam   Oxygen Saturation Interpretation: Normal.        ED Triage Vitals   BP Temp Temp Source Pulse Resp SpO2 Height Weight   07/16/21 1439 07/16/21 1428 07/16/21 1428 07/16/21 1428 07/16/21 1428 07/16/21 1428 07/16/21 1439 07/16/21 1439   138/85 98.2 °F (36.8 °C) Oral 95 16 97 % 5' 6\" (1.676 m) 170 lb (77.1 kg)         Constitutional:  Alert, development consistent with age. Eyes:  PERRL, EOMI, no discharge or conjunctival injection. Ears:  External ears without lesions. Throat:  Pharynx without injection, exudate, or tonsillar hypertrophy. Airway patient. Neck:  Normal ROM. Supple. Respiratory:  Clear to auscultation and breath sounds equal.  CV:  Regular rate and rhythm, normal heart sounds, without pathological murmurs, ectopy, gallops, or rubs. GI:  General Appearance: normal.       Bowel sounds: normal bowel sounds. Distension:  None. Tenderness: No abdominal tenderness. Liver: non-tender. Spleen:  non-tender. Pulsatile Mass: absent. Hernia:  no inguinal or femoral hernias noted. :        Penis: non focal circumcised and non focal uncircumcised. Scrotum: normal.         Testicle: tenderness of right. Integument:  Normal turgor. Warm, dry, without visible rash, unless noted elsewhere. Neurological:  Orientation age-appropriate. Motor functions intact.     Lab / Imaging Results   (All laboratory and radiology results have been personally reviewed by myself)  Labs:  Results for orders placed or performed during the hospital encounter of 07/16/21   Urinalysis, reflex to microscopic   Result Value Ref Range    Color, UA Yellow Straw/Yellow    Clarity, UA Clear Clear    Glucose, Ur Negative Negative mg/dL    Bilirubin Urine Negative Negative Ketones, Urine Negative Negative mg/dL    Specific Gravity, UA 1.025 1.005 - 1.030    Blood, Urine Negative Negative    pH, UA 6.0 5.0 - 9.0    Protein, UA Negative Negative mg/dL    Urobilinogen, Urine 0.2 <2.0 E.U./dL    Nitrite, Urine Negative Negative    Leukocyte Esterase, Urine Negative Negative   CBC Auto Differential   Result Value Ref Range    WBC 6.5 4.5 - 11.5 E9/L    RBC 4.26 3.80 - 5.80 E12/L    Hemoglobin 13.5 12.5 - 16.5 g/dL    Hematocrit 42.0 37.0 - 54.0 %    MCV 98.6 80.0 - 99.9 fL    MCH 31.7 26.0 - 35.0 pg    MCHC 32.1 32.0 - 34.5 %    RDW 12.6 11.5 - 15.0 fL    Platelets 518 208 - 468 E9/L    MPV 10.3 7.0 - 12.0 fL    Neutrophils % 75.4 43.0 - 80.0 %    Immature Granulocytes % 0.5 0.0 - 5.0 %    Lymphocytes % 13.9 (L) 20.0 - 42.0 %    Monocytes % 7.7 2.0 - 12.0 %    Eosinophils % 2.0 0.0 - 6.0 %    Basophils % 0.5 0.0 - 2.0 %    Neutrophils Absolute 4.87 1.80 - 7.30 E9/L    Immature Granulocytes # 0.03 E9/L    Lymphocytes Absolute 0.90 (L) 1.50 - 4.00 E9/L    Monocytes Absolute 0.50 0.10 - 0.95 E9/L    Eosinophils Absolute 0.13 0.05 - 0.50 E9/L    Basophils Absolute 0.03 0.00 - 0.20 E9/L   Comprehensive Metabolic Panel w/ Reflex to MG   Result Value Ref Range    Sodium 138 132 - 146 mmol/L    Potassium reflex Magnesium 4.0 3.5 - 5.0 mmol/L    Chloride 102 98 - 107 mmol/L    CO2 25 22 - 29 mmol/L    Anion Gap 11 7 - 16 mmol/L    Glucose 96 74 - 99 mg/dL    BUN 20 6 - 23 mg/dL    CREATININE 1.4 (H) 0.7 - 1.2 mg/dL    GFR Non-African American 51 >=60 mL/min/1.73    GFR African American >60     Calcium 9.3 8.6 - 10.2 mg/dL    Total Protein 7.6 6.4 - 8.3 g/dL    Albumin 4.4 3.5 - 5.2 g/dL    Total Bilirubin 0.4 0.0 - 1.2 mg/dL    Alkaline Phosphatase 73 40 - 129 U/L    ALT 20 0 - 40 U/L    AST 19 0 - 39 U/L   Lactate, Sepsis   Result Value Ref Range    Lactic Acid, Sepsis 0.8 0.5 - 1.9 mmol/L     Imaging: All Radiology results interpreted by Radiologist unless otherwise noted.   US DUP ABD PEL RETRO SCROT COMPLETE   Final Result   No sonographic evidence for testicular torsion. Bilateral hydroceles. US SCROTUM AND TESTICLES   Final Result   No sonographic evidence for testicular torsion. Bilateral hydroceles. ED Course / Medical Decision Making   Medications - No data to display      Consults:   None    Procedures:   none    MDM: Patient is a 77-year-old male that presented to the emergency department with right testicle \"heaviness and pain with squeezing the area. \"  Patient had a full examination today including a testicular examination with nursing in the room and no pain that is reproducible by myself with examination. Testicles appear of normal size and there is without any scrotal masses, ulceration, redness swelling or pus or any abnormalities. Patient had full lab work as well as a urinalysis as well as an ultrasound of the scrotum and testes and was noted to have bilateral hydroceles. Patient was educated of these findings and the need for follow-up with urology. Patient was educated that he can use Tylenol alternating with ibuprofen and to rest the area. Patient denies any concern for an STD or infection. Patient denies any fever or chills. Patient has no urinary complaints. Patient was advised if any of the symptoms were to change or worsen to return to the emergency department immediately. Patient voiced understanding and agree with the plan and management. Patient currently denying any headaches, blurry vision, chest pain, shortness of breath, fever, chills, nausea, vomiting, severe abdominal pain, change in bowel or bladder movements or any numbness or weakness bilaterally in his extremities. Patient is stable for outpatient follow-up. Patient was explicitly instructed on specific signs and symptoms on which to return to the emergency room for. Patient was instructed to return to the ER for any new or worsening symptoms.  Additional discharge instructions were given verbally. All questions were answered. Patient is comfortable and agreeable with discharge plan. Patient in no acute distress and non-toxic in appearance. Plan of Care/Counseling:  Zuleyka Fontenot PA-C reviewed today's visit with the patient in addition to providing specific details for the plan of care and counseling regarding the diagnosis and prognosis. Questions are answered at this time and are agreeable with the plan. Assessment     1. Hydrocele, bilateral    2. Pain in right testicle      Plan   Discharged home. Patient condition is stable    New Medications     New Prescriptions    No medications on file     Electronically signed by Zuleyka Fontenot PA-C   DD: 7/16/21  **This report was transcribed using voice recognition software. Every effort was made to ensure accuracy; however, inadvertent computerized transcription errors may be present.   END OF ED PROVIDER NOTE        Zuleyka Fontenot PA-C  07/16/21 2475

## 2021-07-16 NOTE — ED NOTES
FIRST PROVIDER CONTACT ASSESSMENT NOTE                                                                                                Department of Emergency Medicine                                                      First Provider Note  21  2:41 PM EDT  NAME: Jose Casper  : 1960  MRN: [id number]    Chief Complaint: Groin Pain (tesicular pain, right)      History of Present Illness:   Jose Casper is a 63 y.o. male who presents to the ED for complaint of right sided testicle pain that has bee ongoing since 8 PM last night. States that his right  scrotum is small and softer than his left. Denies fever or chills. Denies urinary complaints. Focused Physical Exam:  VS:    ED Triage Vitals   BP Temp Temp Source Pulse Resp SpO2 Height Weight   21 1439 21 1428 21 1428 21 1428 21 1428 21 1428 21 1439 21 1439   138/85 98.2 °F (36.8 °C) Oral 95 16 97 % 5' 6\" (1.676 m) 170 lb (77.1 kg)        General: Alert and in no apparent distress. Medical History:  has a past medical history of Cancer [facility]), Diverticulitis, and Multiple closed pelvic fractures without disruption of pelvic ring ([facility] Surgical History:  has a past surgical history that includes Spinal fusion; Colonoscopy; back surgery; laparoscopy (2012); Abdomen surgery; hemicolectomy (12); and other surgical history (Right, 2016). Social History:  reports that he has never smoked. He has never used smokeless tobacco. He reports current alcohol use. He reports that he does not use drugs. Family History: family history is not on file. Allergies: Patient has no known allergies.      Initial Plan of Care:  Initiate Treatment-Testing, Proceed toTreatment Area When Bed Available for ED Attending/MLP to Continue Care    -------------------------------------------------END OF FIRST PROVIDER CONTACT ASSESSMENT NOTE--------------------------------------------------------  Electronically signed by EARNEST Costa CNP   DD: 7/16/21     EARNEST Costa CNP  07/16/21 1443

## 2021-07-16 NOTE — ED NOTES
Pt alert and oriented x4. Speech clear. Respirations easy/unlabored. Skin warm/dry. Appropriate color. No signs of acute distress noted. Pt/family teaching provided; verbalized understanding. Pt stable for discharge.        Harper Thakur RN  07/16/21 1583

## 2021-10-27 ENCOUNTER — OFFICE VISIT (OUTPATIENT)
Dept: FAMILY MEDICINE CLINIC | Age: 61
End: 2021-10-27
Payer: MEDICARE

## 2021-10-27 VITALS
RESPIRATION RATE: 16 BRPM | BODY MASS INDEX: 28.93 KG/M2 | DIASTOLIC BLOOD PRESSURE: 74 MMHG | OXYGEN SATURATION: 99 % | TEMPERATURE: 97.4 F | HEIGHT: 66 IN | WEIGHT: 180 LBS | SYSTOLIC BLOOD PRESSURE: 122 MMHG | HEART RATE: 93 BPM

## 2021-10-27 DIAGNOSIS — C79.9 METASTATIC ADENOCARCINOMA (HCC): ICD-10-CM

## 2021-10-27 DIAGNOSIS — C69.51: ICD-10-CM

## 2021-10-27 DIAGNOSIS — H71.92 CHOLESTEATOMA OF LEFT EAR: Primary | ICD-10-CM

## 2021-10-27 PROCEDURE — G8427 DOCREV CUR MEDS BY ELIG CLIN: HCPCS | Performed by: FAMILY MEDICINE

## 2021-10-27 PROCEDURE — 99213 OFFICE O/P EST LOW 20 MIN: CPT | Performed by: FAMILY MEDICINE

## 2021-10-27 PROCEDURE — 3017F COLORECTAL CA SCREEN DOC REV: CPT | Performed by: FAMILY MEDICINE

## 2021-10-27 PROCEDURE — G8484 FLU IMMUNIZE NO ADMIN: HCPCS | Performed by: FAMILY MEDICINE

## 2021-10-27 PROCEDURE — 1036F TOBACCO NON-USER: CPT | Performed by: FAMILY MEDICINE

## 2021-10-27 PROCEDURE — G8419 CALC BMI OUT NRM PARAM NOF/U: HCPCS | Performed by: FAMILY MEDICINE

## 2021-10-27 SDOH — ECONOMIC STABILITY: FOOD INSECURITY: WITHIN THE PAST 12 MONTHS, THE FOOD YOU BOUGHT JUST DIDN'T LAST AND YOU DIDN'T HAVE MONEY TO GET MORE.: NEVER TRUE

## 2021-10-27 SDOH — ECONOMIC STABILITY: FOOD INSECURITY: WITHIN THE PAST 12 MONTHS, YOU WORRIED THAT YOUR FOOD WOULD RUN OUT BEFORE YOU GOT MONEY TO BUY MORE.: NEVER TRUE

## 2021-10-27 ASSESSMENT — SOCIAL DETERMINANTS OF HEALTH (SDOH): HOW HARD IS IT FOR YOU TO PAY FOR THE VERY BASICS LIKE FOOD, HOUSING, MEDICAL CARE, AND HEATING?: NOT HARD AT ALL

## 2021-10-27 NOTE — PROGRESS NOTES
Renato Gan (:  1960) is a 57 y.o. male,Established patient, here for evaluation of the following chief complaint(s):  Otalgia (\"whistling out of my ears\" reports pain to both ears, difficulty hearing. ongoing 1-2 weeks. )         ASSESSMENT/PLAN:  1. Cholesteatoma of left ear  -     Ana Stone MD, Otolaryngology, [place]  2. Adenocarcinoma of right lacrimal gland [facility])  -     Ana Stone MD, Otolaryngology, [place]  3. Metastatic adenocarcinoma ([facility] [address]      No follow-ups on file. Subjective   SUBJECTIVE/OBJECTIVE:  Otalgia (\"whistling out of my ears\" reports pain to both ears, difficulty hearing. ongoing 1-2 weeks. )        Review of Systems       Objective   /74   Pulse 93   Temp 97.4 °F (36.3 °C) (Temporal)   Resp 16   Ht 5' 6\" (1.676 m)   Wt 180 lb (81.6 kg)   SpO2 99%   BMI 29.05 kg/m²   No results found for: LABA1C  Physical Exam  HENT:      Ears:      Comments: Disruption TM's     Nose: Congestion present. Eyes:      General:         Right eye: No discharge. Left eye: No discharge. Comments: OD missing post resection orbit. Cardiovascular:      Rate and Rhythm: Normal rate and regular rhythm. Heart sounds: No murmur heard. Pulmonary:      Effort: No respiratory distress. Breath sounds: No rhonchi or rales. Abdominal:      General: Abdomen is flat. Tenderness: There is no abdominal tenderness. Musculoskeletal:         General: Normal range of motion. Cervical back: No rigidity. Comments: Decreased ROM neck to SB and rotation   Skin:     General: Skin is warm. Capillary Refill: Capillary refill takes less than 2 seconds. Coloration: Skin is not pale. Findings: No bruising. Neurological:      General: No focal deficit present. Mental Status: He is alert.    Psychiatric:         Mood and Affect: Mood normal.          Start [facility] Zyrtec    On this date 10/27/2021 I have spent 21 minutes

## 2021-10-28 ENCOUNTER — TELEPHONE (OUTPATIENT)
Dept: FAMILY MEDICINE CLINIC | Age: 61
End: 2021-10-28

## 2021-10-28 ENCOUNTER — TELEPHONE (OUTPATIENT)
Dept: ENT CLINIC | Age: 61
End: 2021-10-28

## 2021-10-28 RX ORDER — CETIRIZINE HYDROCHLORIDE 10 MG/1
10 TABLET ORAL DAILY
Qty: 30 TABLET | Refills: 0 | Status: SHIPPED
Start: 2021-10-28 | End: 2021-11-22

## 2021-10-28 NOTE — TELEPHONE ENCOUNTER
Patient calling to schedule for referral for dx: Cholesteatoma of left ear; Adenocarcinoma of right lacrimal gland ([facility] [address] Metastatic adenocarcinoma ([facility] [address] No soon appointments available. Please contact patient to schedule.  Thank you

## 2021-10-28 NOTE — TELEPHONE ENCOUNTER
Called patient to discuss current symptoms cholesteatoma left ear. Patient has history of adenocarcinoma, right lacrimal gland.

## 2021-11-07 NOTE — PROGRESS NOTES
NEW PATIENT VISIT  Chief Complaint   Patient presents with    Ear Problem     Ref by PCP Left cholesteatoma,no ear history. c/o  muffled hearing      History of Present Illness:     [facility]  is a 57 y.o. male presenting with a complex history of right lacrimal gland tumor s/p excision and right orbital exoneration s/p chemo and XRT and concern for right ear cholesteatoma; minimal prior ear history    PMH: cholesteatoma left ear. Patient has history of adenocarcinoma, right lacrimal gland. s/p right partial maxillectomy with orbital exenteration with ENT and neurosurgery ? H/o csf leak    TOBACCO  Social History     Tobacco Use   Smoking Status Never Smoker   Smokeless Tobacco Never Used        ALCOHOL   Social History     Substance and Sexual Activity   Alcohol Use Yes    Comment: occ use        [address]   Social History     Substance and Sexual Activity   Drug Use No        CURRENT OUTPATIENT MEDICATIONS:   Outpatient Medications Marked as Taking for the 11/8/21 encounter (Office Visit) with Vero Reyes MD   Medication Sig Dispense Refill    cetirizine (ZYRTEC) 10 MG tablet Take 1 tablet by mouth daily 30 tablet 0        ALLERGIES:   No Known Allergies    Timing Age of Onset unclear   Duration Increasing in Severity Yes   Days of work missed in last year n/a      Modifying Factors Seasonal variation No        Associated Symptoms Mouth breathing No    Communication concerns Yes    Halitosis No     Family History Family members with similar conditions No   Family history of bleeding concerns No   Family history of anesthia concerns No        Review of Symptoms:  I have reviewed the patient's medical history in detail; there are no changes to the history as noted in the electronic medical record.        /83 (Site: Left Upper Arm, Position: Sitting, Cuff Size: Medium Adult)   Pulse 85   Resp 14   Ht 5' 6\" (1.676 m)   Wt 170 lb (77.1 kg)   SpO2 97%   BMI 27.44 kg/m²     Physical Exam Allergies No Known Allergies   Constitutional Retractions/cyanosis {No     Head and Face   Lesions or masses No  facies symmetrical right orbital exoneration   Eyes Ocular motion with gaze alignment No  right orbital exoneration   Ears Inspection: Scars, lesions or masses Yes   Otoscopy  EAC patent bilaterally without occlusion   cerumen bilaterally completely impacted and cleaned  negative findings: external ears normal to inspection and palpation, TM's clear      Nasal Inspection    No external Scars, lesions or masses    Pyriform apertures widely patent    Nasal musosa healthy   Septum Midline, no Septal Perforation, no septal hematoma   Turbinates Intact, healthy    Oral Cavity Lips no lesions    Teeth healthy without cavities    Gums no lesions    Oral mucosa healthy    Hard and Soft Palate no lesions    Uvula single fid     Tongue no lesions    Tonsils normal size Symmetric without exudate   Neck . Neck supple without tenderness or crepitus   Lymph  Cranial Nerve exam No palpable adenopathy  Grossly intact. CN VII symmetrical   Respiration Symmetric without Increased work of breathing    Cardiovacular No Cyanosis    Skin healthy   Diagnostics    Test ordered No orders of the defined types were placed in this encounter.      Review of existing tests Lab Results   Component Value Date/Time    WBC 6.5 07/16/2021 02:55 PM    HGB 13.5 07/16/2021 02:55 PM    HCT 42.0 07/16/2021 02:55 PM     07/16/2021 02:55 PM    MCV 98.6 07/16/2021 02:55 PM    MCH 31.7 07/16/2021 02:55 PM    MCHC 32.1 07/16/2021 02:55 PM    RDW 12.6 07/16/2021 02:55 PM    NEUTOPHILPCT 75.4 07/16/2021 02:55 PM    LYMPHOPCT 13.9 (L) 07/16/2021 02:55 PM    MONOPCT 7.7 07/16/2021 02:55 PM    EOSRELPCT 2.0 07/16/2021 02:55 PM    BASOPCT 0.5 07/16/2021 02:55 PM    NEUTROABS 4.87 07/16/2021 02:55 PM    LYMPHSABS 0.90 (L) 07/16/2021 02:55 PM    EOSABS 0.13 07/16/2021 02:55 PM        Old records  Reviewed   Discussion with other providers    Done Procedure   Surgeon: Dr. Katina Layne    Procedure: EUA of the ears for bilateral cerumen impaction    Procedure: The right ear was visualized with the ear microscope and excessive cerumen was removed with a curette, alligators and speculum. The drum was intact and pneumatized. The left ear was visualized with the ear microscope and excessive cerumen was removed with a curette. The drum was intact, pneumatized and healthy. Complications: none    EBL: minimal    On this date 11/8/2021 I have spent 15 minutes reviewing previous notes, test results and 45 minutes face to face with the patient discussing the diagnosis and importance of compliance with the treatment plan as well as documenting on the day of the visit. A/P    Impression / Plan:     Shital Garcia is a 57 y.o.  male with bilateral sensorineural hearing loss, no perforation, no cholesteatoma confirmed by audiogram and examination, who will benefit from MRI and hearing aids.  The rest of the exam was benign      Patient will benefit from MRI and hearing aids  Less nasal pressure with irrigation   Patient will need an audiogram at least yearly   Old records were reviewed     Shantal Cowan MD 11/7/21 1:29 PM EST

## 2021-11-08 ENCOUNTER — PROCEDURE VISIT (OUTPATIENT)
Dept: AUDIOLOGY | Age: 61
End: 2021-11-08
Payer: MEDICARE

## 2021-11-08 ENCOUNTER — OFFICE VISIT (OUTPATIENT)
Dept: ENT CLINIC | Age: 61
End: 2021-11-08
Payer: MEDICARE

## 2021-11-08 VITALS
RESPIRATION RATE: 14 BRPM | WEIGHT: 170 LBS | BODY MASS INDEX: 27.32 KG/M2 | HEIGHT: 66 IN | DIASTOLIC BLOOD PRESSURE: 83 MMHG | SYSTOLIC BLOOD PRESSURE: 111 MMHG | OXYGEN SATURATION: 97 % | HEART RATE: 85 BPM

## 2021-11-08 DIAGNOSIS — C69.51: ICD-10-CM

## 2021-11-08 DIAGNOSIS — H90.6 MIXED CONDUCTIVE AND SENSORINEURAL HEARING LOSS OF BOTH EARS: Primary | ICD-10-CM

## 2021-11-08 DIAGNOSIS — H90.3 SENSORINEURAL HEARING LOSS (SNHL), BILATERAL: Primary | ICD-10-CM

## 2021-11-08 PROCEDURE — 92557 COMPREHENSIVE HEARING TEST: CPT | Performed by: AUDIOLOGIST

## 2021-11-08 PROCEDURE — 69210 REMOVE IMPACTED EAR WAX UNI: CPT | Performed by: OTOLARYNGOLOGY

## 2021-11-08 PROCEDURE — 99205 OFFICE O/P NEW HI 60 MIN: CPT | Performed by: OTOLARYNGOLOGY

## 2021-11-08 PROCEDURE — 92567 TYMPANOMETRY: CPT | Performed by: AUDIOLOGIST

## 2021-11-09 NOTE — PROGRESS NOTES
This patient was referred for audiometric/tympanometric testing by Dr. Lucy Plasencia due to hearing loss, worse on the right. He also reported a history of ear surgery. Audiometry revealed an essentially mild sloping to severe mixed hearing loss, bilaterally. Reliability was good. Speech reception thresholds were in good agreement with the pure tone averages, bilaterally. Speech discrimination scores were excellent, bilaterally. Asymmetries noted 3432-6039 Hz, right ear worse. Air-bone gaps noted 500-1000 Hz, bilaterally. Tympanometry revealed flat tympanograms, bilaterally. The results were reviewed with the patient. Recommendations for follow up will be made pending physician consult. Patient is a candidate for and is interested in hearing aids. Will apply for hearing aids through patient's insurance after ENT approval. Pending approval, will order [facility] careersmoreargeable hearing aids, champagne. Patient has Android.      Karla Ledezma CCC-A  [address] [facility] [id number]  Electronically signed by Karla Ledezma on 11/9/2021 at 7:49 AM

## 2021-11-10 DIAGNOSIS — Z01.818 PRE-OP EXAM: Primary | ICD-10-CM

## 2021-11-22 RX ORDER — CETIRIZINE HYDROCHLORIDE 10 MG/1
TABLET ORAL
Qty: 30 TABLET | Refills: 0 | Status: SHIPPED
Start: 2021-11-22 | End: 2021-12-20

## 2021-12-10 ENCOUNTER — HOSPITAL ENCOUNTER (OUTPATIENT)
Dept: MRI IMAGING | Age: 61
Discharge: HOME | End: 2021-12-12
Payer: MEDICARE

## 2021-12-10 ENCOUNTER — HOSPITAL ENCOUNTER (OUTPATIENT)
Age: 61
Discharge: HOME | End: 2021-12-10
Payer: MEDICARE

## 2021-12-10 DIAGNOSIS — Z01.818 PRE-OP EXAM: ICD-10-CM

## 2021-12-10 DIAGNOSIS — H90.3 SENSORINEURAL HEARING LOSS (SNHL), BILATERAL: ICD-10-CM

## 2021-12-10 LAB
BUN BLDV-MCNC: 20 MG/DL (ref 6–23)
CREAT SERPL-MCNC: 1.3 MG/DL (ref 0.7–1.2)
GFR AFRICAN AMERICAN: >60
GFR NON-AFRICAN AMERICAN: 56 ML/MIN/1.73

## 2021-12-10 PROCEDURE — 82565 ASSAY OF CREATININE: CPT

## 2021-12-10 PROCEDURE — 84520 ASSAY OF UREA NITROGEN: CPT

## 2021-12-10 PROCEDURE — 6360000004 HC RX CONTRAST MEDICATION: Performed by: RADIOLOGY

## 2021-12-10 PROCEDURE — 36415 COLL VENOUS BLD VENIPUNCTURE: CPT

## 2021-12-10 PROCEDURE — 70553 MRI BRAIN STEM W/O & W/DYE: CPT

## 2021-12-10 PROCEDURE — A9577 INJ MULTIHANCE: HCPCS | Performed by: RADIOLOGY

## 2021-12-10 RX ADMIN — GADOBENATE DIMEGLUMINE 16 ML: 529 INJECTION, SOLUTION INTRAVENOUS at 13:57

## 2021-12-20 RX ORDER — CETIRIZINE HYDROCHLORIDE 10 MG/1
TABLET ORAL
Qty: 30 TABLET | Refills: 5 | Status: SHIPPED
Start: 2021-12-20 | End: 2022-06-27

## 2022-01-13 ENCOUNTER — PROCEDURE VISIT (OUTPATIENT)
Dept: AUDIOLOGY | Age: 62
End: 2022-01-13

## 2022-01-13 DIAGNOSIS — H90.6 MIXED CONDUCTIVE AND SENSORINEURAL HEARING LOSS OF BOTH EARS: Primary | ICD-10-CM

## 2022-01-13 PROCEDURE — 99999 PR OFFICE/OUTPT VISIT,PROCEDURE ONLY: CPT | Performed by: AUDIOLOGIST

## 2022-01-13 NOTE — PROGRESS NOTES
Fit with binaural Phonak  [facility]  hearing aid. Instructed in use and care. Gave  battery charger, warranty information and scheduled  30 day check for 2/9/22. Made following adjustments: ran feedback. Hearing aid contract/battery warning form reviewed and signed. Hearing aids paired to phone kaylee. Patient was satisfied and will follow up on above date, unless problems arise. No charge visit today. Will bill at 30 day follow up per [facility] guidelines.         Power 4.0 small domes       Electronically signed by Karla Pierre on 1/13/2022 at 3:58 PM

## 2022-01-18 ENCOUNTER — TELEPHONE (OUTPATIENT)
Dept: ENT CLINIC | Age: 62
End: 2022-01-18

## 2022-01-18 NOTE — TELEPHONE ENCOUNTER
Patient called in to the office states he is having difficulty with hearing aids he has questions and would like to see audiology if possible the hearing aid is not working at all.

## 2022-01-19 ENCOUNTER — TELEPHONE (OUTPATIENT)
Dept: AUDIOLOGY | Age: 62
End: 2022-01-19

## 2022-01-19 NOTE — TELEPHONE ENCOUNTER
Patient reached out to ENT staff that he was having trouble with hearing aids. Called him 8:01 AM to see if he could come this afternoon or Friday morning. He stated they started working better yesterday afternoon and he has not tried them yet today. Gave him audiology number to call if later today he still needs to be seen. Told him to leave message if we didn't answer and we will call him back.

## 2022-02-11 ENCOUNTER — PROCEDURE VISIT (OUTPATIENT)
Dept: AUDIOLOGY | Age: 62
End: 2022-02-11
Payer: MEDICARE

## 2022-02-11 DIAGNOSIS — H90.6 MIXED CONDUCTIVE AND SENSORINEURAL HEARING LOSS OF BOTH EARS: Primary | ICD-10-CM

## 2022-02-11 PROCEDURE — V5261 HEARING AID, DIGIT, BIN, BTE: HCPCS | Performed by: AUDIOLOGIST

## 2022-02-11 PROCEDURE — V5160 DISPENSING FEE BINAURAL: HCPCS | Performed by: AUDIOLOGIST

## 2022-02-11 NOTE — PROGRESS NOTES
Patient here for hearing aid check. Doing well. Wants right hearing aid turned up a touch. Increased high 2 clicks and low 1 click. Patient pleased.     Billing done today    PRN

## 2022-02-18 ENCOUNTER — PROCEDURE VISIT (OUTPATIENT)
Dept: AUDIOLOGY | Age: 62
End: 2022-02-18

## 2022-02-18 DIAGNOSIS — H90.6 MIXED CONDUCTIVE AND SENSORINEURAL HEARING LOSS OF BOTH EARS: Primary | ICD-10-CM

## 2022-02-18 PROCEDURE — 99999 PR OFFICE/OUTPT VISIT,PROCEDURE ONLY: CPT | Performed by: AUDIOLOGIST

## 2022-03-21 ENCOUNTER — TELEPHONE (OUTPATIENT)
Dept: ENT CLINIC | Age: 62
End: 2022-03-21

## 2022-03-21 ENCOUNTER — PROCEDURE VISIT (OUTPATIENT)
Dept: AUDIOLOGY | Age: 62
End: 2022-03-21

## 2022-03-21 DIAGNOSIS — H90.6 MIXED CONDUCTIVE AND SENSORINEURAL HEARING LOSS OF BOTH EARS: Primary | ICD-10-CM

## 2022-03-21 PROCEDURE — 99999 PR OFFICE/OUTPT VISIT,PROCEDURE ONLY: CPT | Performed by: AUDIOLOGIST

## 2022-03-21 NOTE — PROGRESS NOTES
Patient seen today for hearing aid check. He reported his right aid was not working when he put it on Monday morning. Reportedly changed the dome and wax trap at home with no improvement in function. Listening check revealed weak function of right device compared to left (symmetric HL). Changed dome and wax trap in office but there was no improvement in function. Changed  with stock 1xM . Listening check revealed appropriate function. Reinstructed patient on hearing aid removal to reduced stress placed on  wires. Follow-up PRN.      Electronically signed by Karla Palacio on 3/22/2022 at 12:28 PM

## 2022-03-21 NOTE — TELEPHONE ENCOUNTER
Patient LVM stating he put his hearing aid in his ear this morning and it is not working. States he has tried multiple things to try to get it to work with no success. Please advise.      Electronically signed by Micheal Horan [address] [place] [address] Kareen Wasserman on 3/21/2022 at 11:25 AM

## 2022-03-22 NOTE — TELEPHONE ENCOUNTER
Patient seen by audiology 3/21/22.     Electronically signed by Jennifer Anton MA on 3/22/22 at 10:12 AM EDT

## 2022-03-23 ENCOUNTER — OFFICE VISIT (OUTPATIENT)
Dept: FAMILY MEDICINE CLINIC | Age: 62
End: 2022-03-23
Payer: MEDICARE

## 2022-03-23 ENCOUNTER — HOSPITAL ENCOUNTER (OUTPATIENT)
Age: 62
Discharge: HOME | End: 2022-03-23
Payer: MEDICARE

## 2022-03-23 VITALS
TEMPERATURE: 97.5 F | WEIGHT: 176.4 LBS | SYSTOLIC BLOOD PRESSURE: 120 MMHG | RESPIRATION RATE: 16 BRPM | OXYGEN SATURATION: 97 % | BODY MASS INDEX: 28.35 KG/M2 | DIASTOLIC BLOOD PRESSURE: 78 MMHG | HEART RATE: 79 BPM | HEIGHT: 66 IN

## 2022-03-23 DIAGNOSIS — N17.9 ACUTE RENAL FAILURE SUPERIMPOSED ON STAGE 3A CHRONIC KIDNEY DISEASE, UNSPECIFIED ACUTE RENAL FAILURE TYPE (HCC): ICD-10-CM

## 2022-03-23 DIAGNOSIS — N17.9 ACUTE RENAL FAILURE SUPERIMPOSED ON STAGE 3A CHRONIC KIDNEY DISEASE, UNSPECIFIED ACUTE RENAL FAILURE TYPE (HCC): Primary | ICD-10-CM

## 2022-03-23 DIAGNOSIS — N18.31 ACUTE RENAL FAILURE SUPERIMPOSED ON STAGE 3A CHRONIC KIDNEY DISEASE, UNSPECIFIED ACUTE RENAL FAILURE TYPE (HCC): Primary | ICD-10-CM

## 2022-03-23 DIAGNOSIS — N18.31 ACUTE RENAL FAILURE SUPERIMPOSED ON STAGE 3A CHRONIC KIDNEY DISEASE, UNSPECIFIED ACUTE RENAL FAILURE TYPE (HCC): ICD-10-CM

## 2022-03-23 LAB
ALBUMIN SERPL-MCNC: 4.1 G/DL (ref 3.5–5.2)
ANION GAP SERPL CALCULATED.3IONS-SCNC: 14 MMOL/L (ref 7–16)
BUN BLDV-MCNC: 12 MG/DL (ref 6–23)
CALCIUM SERPL-MCNC: 9.3 MG/DL (ref 8.6–10.2)
CHLORIDE BLD-SCNC: 103 MMOL/L (ref 98–107)
CO2: 23 MMOL/L (ref 22–29)
CREAT SERPL-MCNC: 1.3 MG/DL (ref 0.7–1.2)
GFR AFRICAN AMERICAN: >60
GFR NON-AFRICAN AMERICAN: 56 ML/MIN/1.73
GLUCOSE BLD-MCNC: 114 MG/DL (ref 74–99)
PHOSPHORUS: 3.6 MG/DL (ref 2.5–4.5)
POTASSIUM SERPL-SCNC: 4.2 MMOL/L (ref 3.5–5)
SODIUM BLD-SCNC: 140 MMOL/L (ref 132–146)

## 2022-03-23 PROCEDURE — G8419 CALC BMI OUT NRM PARAM NOF/U: HCPCS | Performed by: FAMILY MEDICINE

## 2022-03-23 PROCEDURE — 99214 OFFICE O/P EST MOD 30 MIN: CPT | Performed by: FAMILY MEDICINE

## 2022-03-23 PROCEDURE — G8484 FLU IMMUNIZE NO ADMIN: HCPCS | Performed by: FAMILY MEDICINE

## 2022-03-23 PROCEDURE — 1036F TOBACCO NON-USER: CPT | Performed by: FAMILY MEDICINE

## 2022-03-23 PROCEDURE — 80069 RENAL FUNCTION PANEL: CPT

## 2022-03-23 PROCEDURE — 36415 COLL VENOUS BLD VENIPUNCTURE: CPT

## 2022-03-23 PROCEDURE — 3017F COLORECTAL CA SCREEN DOC REV: CPT | Performed by: FAMILY MEDICINE

## 2022-03-23 PROCEDURE — G8427 DOCREV CUR MEDS BY ELIG CLIN: HCPCS | Performed by: FAMILY MEDICINE

## 2022-03-23 RX ORDER — GABAPENTIN 300 MG/1
CAPSULE ORAL
COMMUNITY
Start: 2022-02-28

## 2022-03-23 ASSESSMENT — PATIENT HEALTH QUESTIONNAIRE - PHQ9
SUM OF ALL RESPONSES TO PHQ QUESTIONS 1-9: 0
2. FEELING DOWN, DEPRESSED OR HOPELESS: 0
SUM OF ALL RESPONSES TO PHQ9 QUESTIONS 1 & 2: 0
1. LITTLE INTEREST OR PLEASURE IN DOING THINGS: 0
SUM OF ALL RESPONSES TO PHQ QUESTIONS 1-9: 0

## 2022-03-23 ASSESSMENT — ENCOUNTER SYMPTOMS
DIARRHEA: 0
BLOOD IN STOOL: 0
CONSTIPATION: 0
WHEEZING: 0

## 2022-03-23 NOTE — PROGRESS NOTES
Guadalupe Whiteside (:  1960) is a 57 y.o. male,Established patient, here for evaluation of the following chief complaint(s):  Eye Injury (Patient had his Right eye removed and the day after he went home and bent over and hit his right side with his bed rail and has a fracture on the eyebrow he states that there are fragments of bone that move around and irritate him)         ASSESSMENT/PLAN:  1. Acute renal failure superimposed on stage 3a chronic kidney disease, unspecified acute renal failure type [facility])  -     Renal Function Panel; Future      No follow-ups on file. Subjective   SUBJECTIVE/OBJECTIVE:  Eye Injury (Patient had his Right eye removed and the day after he went home and bent over and hit his right side with his bed rail and has a fracture on the eyebrow he states that there are fragments of bone that move around and irritate him). Denies LOC. DOI was on 2020. Review of Systems   Constitutional: Negative for chills and diaphoresis. HENT: Negative for ear discharge, ear pain, hearing loss, nosebleeds and tinnitus. Respiratory: Negative for wheezing. Cardiovascular: Negative for chest pain. Gastrointestinal: Negative for blood in stool, constipation and diarrhea. Genitourinary: Negative for dysuria, flank pain and hematuria. Skin: Negative for rash. Neurological: Negative for headaches. Hematological: Does not bruise/bleed easily. Objective   /78   Pulse 79   Temp 97.5 °F (36.4 °C) (Temporal)   Resp 16   Ht 5' 6\" (1.676 m)   Wt 176 lb 6.4 oz (80 kg)   SpO2 97%   BMI 28.47 kg/m²   No results found for: LABA1C  Physical Exam  Eyes:      General:         Right eye: No discharge. Left eye: No discharge. Comments: OD surgically absent, due to adenocarcinoma. In right nasal cavity, he has a 3 mm ulceration in the roof,,,,,,not sure if post surgical or if a problem.    Cardiovascular:      Rate and Rhythm: Normal rate and regular rhythm. Heart sounds: No murmur heard. Pulmonary:      Effort: No respiratory distress. Breath sounds: No rhonchi or rales. Abdominal:      Tenderness: There is no abdominal tenderness. Musculoskeletal:      Cervical back: No rigidity. Comments: Skull Right brow 2 loose bodies movable and painful since head trauma post OD exenteration. Skin:     General: Skin is warm. Capillary Refill: Capillary refill takes less than 2 seconds. Coloration: Skin is not pale. Findings: No bruising. Neurological:      Mental Status: He is alert. Psychiatric:         Mood and Affect: Mood normal.            On this date 3/23/2022 I have spent 32 minutes reviewing previous notes, test results and face to face with the patient discussing the diagnosis and importance of compliance with the treatment plan as well as documenting on the day of the visit. An electronic signature was used to authenticate this note.     --Jorge Lake DO

## 2022-04-01 ENCOUNTER — TELEPHONE (OUTPATIENT)
Dept: FAMILY MEDICINE CLINIC | Age: 62
End: 2022-04-01

## 2022-04-01 NOTE — TELEPHONE ENCOUNTER
Patient requesting lab results from 3/23/22. Patient states he is concerned regarding the spot you found in his nasal passage, he does not know what the next step will be for this issue.

## 2022-04-04 NOTE — TELEPHONE ENCOUNTER
No meaningful change on BW. Spot supposed to be looked at by his surgeon. My understanding was that he was to see him shortly. If he is not then we may try to have a local ENT see him to look at that area.

## 2022-04-08 ENCOUNTER — TELEPHONE (OUTPATIENT)
Dept: ADMINISTRATIVE | Age: 62
End: 2022-04-08

## 2022-04-08 NOTE — TELEPHONE ENCOUNTER
Pt went to the [facility] [place] today for another appointment and they advised him to be seen with ENT as he has Bilateral Ear wax build up, He was asking to be seen today. Please contact.

## 2022-04-23 NOTE — PROGRESS NOTES
CC:   Chief Complaint   Patient presents with    Ear Problem     impacted cerumen      Rodrigo Ji is a 57 y.o. male with bilateral SNHL, cerumen noted at another office, and asked to clean. 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 Was thought to have cerumen at another provider and asked to evaluate. Known bilateral SNHL     PMH: cholesteatoma left ear. Patient has history of adenocarcinoma, right lacrimal gland. s/p right partial maxillectomy with orbital exenteration with ENT and neurosurgery ?  H/o csf leak     PAST MEDICAL HISTORY:   Past Medical History:   Diagnosis Date    Cancer [address] [address]     Diverticulitis     Multiple closed pelvic fractures without disruption of pelvic ring ([facility] [address] 7/12/2013        PAST SURGICAL HISTORY:   Past Surgical History:   Procedure Laterality Date    ABDOMEN SURGERY      BACK SURGERY      COLONOSCOPY      10/19/2011    HEMICOLECTOMY  1-5-12    laprascopic     LAPAROSCOPY  jan 2012    colonoscopy    OTHER SURGICAL HISTORY Right 4-    ORIF right thumb    SPINAL FUSION          SOCIAL HISTORY:   Social History     Socioeconomic History    Marital status:      Spouse name: Not on file    Number of children: Not on file    Years of education: Not on file    Highest education level: Not on file   Occupational History    Not on file   Tobacco Use    Smoking status: Never Smoker    Smokeless tobacco: Never Used   Substance and Sexual Activity    Alcohol use: Yes     Comment: occ use    Drug use: No    Sexual activity: Not on file   Other Topics Concern    Not on file   Social History Narrative    ** Merged History Encounter **          Social Determinants of Health     Financial Resource Strain: Low Risk     Difficulty of Paying Living Expenses: Not hard at all   Food Insecurity: No Food Insecurity    Worried About Running Out of Food in the Last Year: Never true    Ankur of Food in the Last Year: Never true   Transportation Needs:     Lack of Transportation (Medical): Not on file    Lack of Transportation (Non-Medical): Not on file   Physical Activity:     Days of Exercise per Week: Not on file    Minutes of Exercise per Session: Not on file   Stress:     Feeling of Stress : Not on file   Social Connections:     Frequency of Communication with Friends and Family: Not on file    Frequency of Social Gatherings with Friends and Family: Not on file    Attends Yarsani Services: Not on file    Active Member of [address] [place] [address] [employer] or Organizations: Not on file    Attends Club or Organization Meetings: Not on file    Marital Status: Not on file   Intimate Partner Violence:     Fear of Current or Ex-Partner: Not on file    Emotionally Abused: Not on file    Physically Abused: Not on file    Sexually Abused: Not on file   Housing Stability:     Unable to Pay for Housing in the Last Year: Not on file    Number of [place] in the Last Year: Not on file    Unstable Housing in the Last Year: Not on file        TOBACCO  Social History     Tobacco Use   Smoking Status Never Smoker   Smokeless Tobacco Never Used        ALCOHOL   Social History     Substance and Sexual Activity   Alcohol Use Yes    Comment: occ use        DRUGHX   Social History     Substance and Sexual Activity   Drug Use No         CURRENT OUTPATIENT MEDICATIONS:   Outpatient Medications Marked as Taking for the 4/25/22 encounter (Office Visit) with Jameel Chairez MD   Medication Sig Dispense Refill    gabapentin (NEURONTIN) 300 MG capsule TAKE 1 CAPSULE BY MOUTH THREE TIMES DAILY  DAYS.       cetirizine (ZYRTEC) 10 MG tablet TAKE 1 TABLET BY MOUTH EVERY DAY 30 tablet 5        ALLERGIES:   No Known Allergies    ROS: I have reviewed the patient's medical history in detail; there are no changes to the history as noted in the electronic medical record. Exam: Ht 5' 6\" (1.676 m)   Wt 165 lb (74.8 kg)   BMI 26.63 kg/m²   Erik Murray is a 57 y.o. male  appears well, in no apparent distress. Alert and oriented times three, pleasant and cooperative. Vital signs are as documented in vital signs section. Breathing comfortably, without stertor or stridor  Right known orbital exoneration  Ear exam: External ears normal. Canals with cerumen, cleaned. TM's normal.  No nasal lesions, no erythema   No oral lesions. There is no palpable adenopathy or tenderness    Lab Results   Component Value Date/Time    WBC 6.5 07/16/2021 02:55 PM    HGB 13.5 07/16/2021 02:55 PM    HCT 42.0 07/16/2021 02:55 PM     07/16/2021 02:55 PM    MCV 98.6 07/16/2021 02:55 PM    MCH 31.7 07/16/2021 02:55 PM    MCHC 32.1 07/16/2021 02:55 PM    RDW 12.6 07/16/2021 02:55 PM    NEUTOPHILPCT 75.4 07/16/2021 02:55 PM    LYMPHOPCT 13.9 (L) 07/16/2021 02:55 PM    MONOPCT 7.7 07/16/2021 02:55 PM    EOSRELPCT 2.0 07/16/2021 02:55 PM    BASOPCT 0.5 07/16/2021 02:55 PM    NEUTROABS 4.87 07/16/2021 02:55 PM    LYMPHSABS 0.90 (L) 07/16/2021 02:55 PM    EOSABS 0.13 07/16/2021 02:55 PM       On this date 4/25/2022 I have spent 10 minutes reviewing previous notes, test results and 35 min face to face with the patient discussing the diagnosis and importance of compliance with the treatment plan as well as documenting on the day of the visit. Procedure: Bilateral ear cleaning    Procedure: The right ear was visualized with the ear microscope and excessive cerumen was removed with a curette. The drum was intact and pneumatized. The left ear was visualized with the ear microscope and excessive cerumen was removed with a curette and suction. The drum was intact, pneumatized and healthy.     Complications: none    EBL: minimal    A/P:     Erik Murray is a 57 y.o. male with complicated medical history, bilateral cerumen impaction that was cleaned today  Yearly audiograms  Baby oil/olive oil after hearing aids are removed to soften cerumen as his ears are so sensitive to cleaning    Nahun Hernandez MD 4/23/22 4:17 PM EDT

## 2022-04-25 ENCOUNTER — OFFICE VISIT (OUTPATIENT)
Dept: ENT CLINIC | Age: 62
End: 2022-04-25
Payer: MEDICARE

## 2022-04-25 VITALS — WEIGHT: 165 LBS | BODY MASS INDEX: 26.52 KG/M2 | HEIGHT: 66 IN

## 2022-04-25 DIAGNOSIS — C69.51: ICD-10-CM

## 2022-04-25 DIAGNOSIS — H90.3 SENSORINEURAL HEARING LOSS (SNHL), BILATERAL: Primary | ICD-10-CM

## 2022-04-25 DIAGNOSIS — H61.23 CERUMEN DEBRIS ON TYMPANIC MEMBRANE OF BOTH EARS: ICD-10-CM

## 2022-04-25 PROCEDURE — 3017F COLORECTAL CA SCREEN DOC REV: CPT | Performed by: OTOLARYNGOLOGY

## 2022-04-25 PROCEDURE — 69210 REMOVE IMPACTED EAR WAX UNI: CPT | Performed by: OTOLARYNGOLOGY

## 2022-04-25 PROCEDURE — G8427 DOCREV CUR MEDS BY ELIG CLIN: HCPCS | Performed by: OTOLARYNGOLOGY

## 2022-04-25 PROCEDURE — G8419 CALC BMI OUT NRM PARAM NOF/U: HCPCS | Performed by: OTOLARYNGOLOGY

## 2022-04-25 PROCEDURE — 1036F TOBACCO NON-USER: CPT | Performed by: OTOLARYNGOLOGY

## 2022-04-25 PROCEDURE — 99215 OFFICE O/P EST HI 40 MIN: CPT | Performed by: OTOLARYNGOLOGY

## 2022-06-24 PROCEDURE — 99283 EMERGENCY DEPT VISIT LOW MDM: CPT

## 2022-06-25 ENCOUNTER — HOSPITAL ENCOUNTER (EMERGENCY)
Age: 62
Discharge: HOME | End: 2022-06-25
Payer: MEDICARE

## 2022-06-25 VITALS
DIASTOLIC BLOOD PRESSURE: 77 MMHG | RESPIRATION RATE: 15 BRPM | SYSTOLIC BLOOD PRESSURE: 122 MMHG | OXYGEN SATURATION: 97 % | HEART RATE: 75 BPM | TEMPERATURE: 97.8 F | WEIGHT: 157 LBS | BODY MASS INDEX: 25.34 KG/M2

## 2022-06-25 DIAGNOSIS — S80.861A INSECT BITE OF RIGHT LOWER EXTREMITY, INITIAL ENCOUNTER: Primary | ICD-10-CM
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
## 2022-06-25 PROCEDURE — 6370000000 HC RX 637 (ALT 250 FOR IP): Performed by: PHYSICIAN ASSISTANT

## 2022-06-25 RX ORDER — DOXYCYCLINE HYCLATE 100 MG/1
100 CAPSULE ORAL ONCE
Status: COMPLETED | OUTPATIENT
Start: 2022-06-25 | End: 2022-06-25

## 2022-06-25 RX ORDER — DOXYCYCLINE HYCLATE 100 MG/1
100 CAPSULE ORAL 2 TIMES DAILY
Qty: 20 CAPSULE | Refills: 0 | Status: SHIPPED | OUTPATIENT
Start: 2022-06-25 | End: 2022-07-05

## 2022-06-25 RX ADMIN — DOXYCYCLINE 100 MG: 100 CAPSULE ORAL at 01:02

## 2022-06-25 NOTE — ED PROVIDER NOTES
[facility]  HPI:  6/25/22, Time: 12:11 AM EDT         Nicole Woodson is a 57 y.o. male presenting to the ED for  Tick bite  beginning today . The complaint has been persistent, mild in severity, and worsened by nothing. patient comes in with complaint of possible tick bite of the right posterior leg. He states he knocked it off. Unsure of how long the tick was present. Denies any fevers. No rash. Review of Systems:   A complete review of systems was performed and pertinent positives and negatives are stated within HPI, all other systems reviewed and are negative.          --------------------------------------------- PAST HISTORY ---------------------------------------------  Past Medical History:  has a past medical history of Cancer ([address] Diverticulitis, and Multiple closed pelvic fractures without disruption of pelvic ring ([address] Past Surgical History:  has a past surgical history that includes Spinal fusion; Colonoscopy; back surgery; laparoscopy (jan 2012); Abdomen surgery; hemicolectomy (1-5-12); and other surgical history (Right, 4-). Social History:  reports that he has never smoked. He has never used smokeless tobacco. He reports current alcohol use. He reports that he does not use drugs. Family History: family history is not on file. The patients home medications have been reviewed. Allergies: Patient has no known allergies. -------------------------------------------------- RESULTS -------------------------------------------------  All laboratory and radiology results have been personally reviewed by myself   LABS:  No results found for this visit on 06/25/22. RADIOLOGY:  Interpreted by Radiologist.  No orders to display       ------------------------- NURSING NOTES AND VITALS REVIEWED ---------------------------   The nursing notes within the ED encounter and vital signs as below have been reviewed.    /77   Pulse 75   Temp 97.8 °F (36.6 °C) (Oral) Resp 15   Wt 157 lb (71.2 kg)   SpO2 97%   BMI 25.34 kg/m²   Oxygen Saturation Interpretation: Normal      ---------------------------------------------------PHYSICAL EXAM--------------------------------------      Constitutional/General: Alert and oriented x3, well appearing, non toxic in NAD  Head: Normocephalic and atraumatic  Eyes: PERRL, EOMI  Mouth: Oropharynx clear, handling secretions, no trismus  Neck: Supple, full ROM,   Pulmonary: Lungs clear to auscultation bilaterally, no wheezes, rales, or rhonchi. Not in respiratory distress  Cardiovascular:  Regular rate and rhythm, no murmurs, gallops, or rubs. 2+ distal pulses  Abdomen: Soft, non tender, non distended,   Extremities: Moves all extremities x 4. Warm and well perfused  Skin: warm and dry without rash  Neurologic: GCS 15,  Psych: Normal Affect      ------------------------------ ED COURSE/MEDICAL DECISION MAKING----------------------  Medications   doxycycline hyclate (VIBRAMYCIN) capsule 100 mg (has no administration in time range)         ED COURSE:       Medical Decision Making:    Patient came in with complaint of tick bite with unknown duration of time the tick was attached to his leg. There is no rash present at this time he has no fever. He was placed on doxycycline prophylactically. Counseling: The emergency provider has spoken with the patient and discussed todays results, in addition to providing specific details for the plan of care and counseling regarding the diagnosis and prognosis. Questions are answered at this time and they are agreeable with the plan.      --------------------------------- IMPRESSION AND DISPOSITION ---------------------------------    IMPRESSION  1. Insect bite of right lower extremity, initial encounter        DISPOSITION  Disposition: Discharge to home  Patient condition is good      NOTE: This report was transcribed using voice recognition software.  Every effort was made to ensure accuracy; however, inadvertent computerized transcription errors may be present     Bree Martinez [place]  06/25/22 0033    ATTENDING PROVIDER ATTESTATION:     Supervising Physician, on-site, available for consultation, non-participatory in the evaluation or care of this patient         Cierra Calix MD  06/25/22 [id number]

## 2022-06-27 RX ORDER — CETIRIZINE HYDROCHLORIDE 10 MG/1
TABLET ORAL
Qty: 30 TABLET | Refills: 3 | Status: SHIPPED | OUTPATIENT
Start: 2022-06-27

## 2022-07-20 ENCOUNTER — TELEPHONE (OUTPATIENT)
Dept: ENT CLINIC | Age: 62
End: 2022-07-20

## 2022-07-20 NOTE — TELEPHONE ENCOUNTER
Patient called into the office 7/20/2022 at 9:30am states hearing aid is not working well and has questions advised patient I would have an audiologist call him back by 7/21/2022.

## 2022-07-21 ENCOUNTER — PROCEDURE VISIT (OUTPATIENT)
Dept: AUDIOLOGY | Age: 62
End: 2022-07-21

## 2022-07-21 DIAGNOSIS — H90.6 MIXED CONDUCTIVE AND SENSORINEURAL HEARING LOSS OF BOTH EARS: Primary | ICD-10-CM

## 2022-07-21 PROCEDURE — 99999 PR OFFICE/OUTPT VISIT,PROCEDURE ONLY: CPT | Performed by: AUDIOLOGIST

## 2022-07-21 NOTE — PROGRESS NOTES
Patient seen at [facility] office for a hearing aid check. Left hearing aid not working. Left hearing listened to with new wax filter and still no sound. Tried right speaker on left hearing aid. Worked fine that way pointing to the left speaker being he issue.      Scheduled him to come to the [facility] office on Wednesday 7/27/22 at 2 pm.    Order confirmation for replacement- [place], [facility]  [address] # [id number]

## 2022-07-27 ENCOUNTER — PROCEDURE VISIT (OUTPATIENT)
Dept: AUDIOLOGY | Age: 62
End: 2022-07-27

## 2022-07-27 DIAGNOSIS — H90.6 MIXED CONDUCTIVE AND SENSORINEURAL HEARING LOSS OF BOTH EARS: Primary | ICD-10-CM

## 2022-07-27 PROCEDURE — 99999 PR OFFICE/OUTPT VISIT,PROCEDURE ONLY: CPT | Performed by: AUDIOLOGIST

## 2022-07-27 NOTE — PROGRESS NOTES
Patient seen to replace left hearing aid speaker that was not working. Speaker changed an hearing aid working. Patient pleased. Patient asked if he has wax in his ears. Otoscopy revealed minimal ear wax, otherwise clear canals. Follow up as needed.   Speaker under warranty     Asim Denton, Karla/CCC-A  [facility] [employer] Lic # [id number]

## 2022-08-29 ENCOUNTER — TELEPHONE (OUTPATIENT)
Dept: ADMINISTRATIVE | Age: 62
End: 2022-08-29

## 2022-08-29 NOTE — TELEPHONE ENCOUNTER
Pt would like to make an appointment for Left ear. It is irritated and sore. Hard to wear his hearing aids through the day. Appx 1-2 weeks. Please contact pt to schedule.

## 2022-08-31 ENCOUNTER — TELEPHONE (OUTPATIENT)
Dept: ENT CLINIC | Age: 62
End: 2022-08-31

## 2022-08-31 NOTE — TELEPHONE ENCOUNTER
Pt called in to cancel his appt on 9/1/22, his car is in the shop. He will call back at later time to r/s. Thank you.

## 2022-09-09 ENCOUNTER — TELEPHONE (OUTPATIENT)
Dept: ADMINISTRATIVE | Age: 62
End: 2022-09-09

## 2022-09-09 NOTE — TELEPHONE ENCOUNTER
Pt has an appt on 09/26 at 9:00 am. He has another appt and needs to reschedule. 1 year w/ audio. Please contct pt.

## 2022-09-09 NOTE — TELEPHONE ENCOUNTER
Please advise scheduling for Dr. Jacinto Curran.     Electronically signed by Harriet Henry on 9/9/22 at 10:12 AM EDT

## 2022-09-24 NOTE — PROGRESS NOTES
CC:   Chief Complaint   Patient presents with    Follow-up     1 year w/audio left ear irritation, possible swimmers ear     Drew James is a 60 y.o. male with bilateral SNHL, cerumen noted at another office, and asked to clean. He has been wearing his hearing aids with good outcomes for a few months but does have some hearing concerns     He had presented with a complex history of right lacrimal gland tumor s/p excision and right orbital exoneration s/p chemo and XRT and noted to have no evidence of cholesteatoma at the last visit and IAC normal on MRI. Was thought to have cerumen at another provider and asked to evaluate. Known bilateral SNHL     PMH: cholesteatoma left ear.  Patient has history of adenocarcinoma, right lacrimal gland. s/p right partial maxillectomy with orbital exenteration with ENT and neurosurgery; hearing stable today         PAST MEDICAL HISTORY:   Past Medical History:   Diagnosis Date    Cancer ([address]     Diverticulitis     Multiple closed pelvic fractures without disruption of pelvic ring ([address] 7/12/2013        PAST SURGICAL HISTORY:   Past Surgical History:   Procedure Laterality Date    ABDOMEN SURGERY      BACK SURGERY      COLONOSCOPY      10/19/2011    HEMICOLECTOMY  1-5-12    laprascopic     LAPAROSCOPY  jan 2012    colonoscopy    OTHER SURGICAL HISTORY Right 4-    ORIF right thumb    SPINAL FUSION          SOCIAL HISTORY:   Social History     Socioeconomic History    Marital status:      Spouse name: Not on file    Number of children: Not on file    Years of education: Not on file    Highest education level: Not on file   Occupational History    Not on file   Tobacco Use    Smoking status: Never    Smokeless tobacco: Never   Substance and Sexual Activity    Alcohol use: Yes     Comment: occ use    Drug use: No    Sexual activity: Not on file   Other Topics Concern    Not on file   Social History Narrative    ** Merged History Encounter **          Social Determinants of Health     Financial Resource Strain: Low Risk     Difficulty of Paying Living Expenses: Not hard at all   Food Insecurity: No Food Insecurity    Worried About Running Out of Food in the Last Year: Never true    Ran Out of Food in the Last Year: Never true   Transportation Needs: Not on file   Physical Activity: Not on file   Stress: Not on file   Social Connections: Not on file   Intimate Partner Violence: Not on file   Housing Stability: Not on file        TOBACCO  Social History     Tobacco Use   Smoking Status Never   Smokeless Tobacco Never        ALCOHOL   Social History     Substance and Sexual Activity   Alcohol Use Yes    Comment: occ use        [address]   Social History     Substance and Sexual Activity   Drug Use No         CURRENT OUTPATIENT MEDICATIONS:   Outpatient Medications Marked as Taking for the 9/26/22 encounter (Office Visit) with Valentine Spear MD   Medication Sig Dispense Refill    cetirizine (ZYRTEC) 10 MG tablet TAKE 1 TABLET BY MOUTH EVERY DAY 30 tablet 3    gabapentin (NEURONTIN) 300 MG capsule TAKE 1 CAPSULE BY MOUTH THREE TIMES DAILY  DAYS. ALLERGIES:   No Known Allergies    ROS: I have reviewed the patient's medical history in detail; there are no changes to the history as noted in the electronic medical record. Exam: /80 (Site: Left Upper Arm, Position: Sitting, Cuff Size: Large Adult)   Pulse 75   Ht 5' 6\" (1.676 m)   Wt 157 lb (71.2 kg)   SpO2 99%   BMI 25.34 kg/m²   Ted Brock is a 60 y.o. male  appears well, in no apparent distress. Alert and oriented times three, pleasant and cooperative. Vital signs are as documented in vital signs section. Breathing comfortably, without stertor or stridor  Known right eye absent with patch  Ear exam: External ears normal. Canal with cerumen (cleaned). TM's normal.  No nasal lesions, no erythema   No oral lesions.   There is no palpable adenopathy or tenderness    Lab Results   Component Value Date/Time    WBC 6.5 07/16/2021 02:55 PM    HGB 13.5 07/16/2021 02:55 PM    HCT 42.0 07/16/2021 02:55 PM     07/16/2021 02:55 PM    MCV 98.6 07/16/2021 02:55 PM    MCH 31.7 07/16/2021 02:55 PM    MCHC 32.1 07/16/2021 02:55 PM    RDW 12.6 07/16/2021 02:55 PM    NEUTOPHILPCT 75.4 07/16/2021 02:55 PM    LYMPHOPCT 13.9 (L) 07/16/2021 02:55 PM    MONOPCT 7.7 07/16/2021 02:55 PM    EOSRELPCT 2.0 07/16/2021 02:55 PM    BASOPCT 0.5 07/16/2021 02:55 PM    NEUTROABS 4.87 07/16/2021 02:55 PM    LYMPHSABS 0.90 (L) 07/16/2021 02:55 PM    EOSABS 0.13 07/16/2021 02:55 PM     On this date 9/26/2022 I have spent 10 minutes reviewing previous notes, test results and 20 min face to face with the patient discussing the diagnosis and importance of compliance with the treatment plan as well as documenting on the day of the visit. Procedure: EUA of the ears    Risks and benefits including bleeding, persistent hearing loss, persistent drainage    Procedure: The right ear was visualized with the ear microscope and excessive cerumen was removed with a curette. The drum was intact and pneumatized. The left ear was visualized with the ear microscope and excessive cerumen was removed with a curette. The drum was intact, pneumatized and healthy.     Complications: none    EBL: minimal      A/P:     Bridgette Hunt is a 60 y.o. male with bilateral hearing loss and hearing aids and bilateral cerumen, cleaned  Continue home regimen for cerumen management  Follow up yearly with audio    Genoveva Reddy MD 9/24/22 5:08 PM EDT

## 2022-09-26 ENCOUNTER — PROCEDURE VISIT (OUTPATIENT)
Dept: AUDIOLOGY | Age: 62
End: 2022-09-26
Payer: MEDICARE

## 2022-09-26 ENCOUNTER — OFFICE VISIT (OUTPATIENT)
Dept: ENT CLINIC | Age: 62
End: 2022-09-26
Payer: MEDICARE

## 2022-09-26 VITALS
HEART RATE: 75 BPM | HEIGHT: 66 IN | OXYGEN SATURATION: 99 % | SYSTOLIC BLOOD PRESSURE: 123 MMHG | DIASTOLIC BLOOD PRESSURE: 80 MMHG | WEIGHT: 157 LBS | BODY MASS INDEX: 25.23 KG/M2

## 2022-09-26 DIAGNOSIS — C69.51: ICD-10-CM

## 2022-09-26 DIAGNOSIS — H90.6 MIXED CONDUCTIVE AND SENSORINEURAL HEARING LOSS OF BOTH EARS: Primary | ICD-10-CM

## 2022-09-26 DIAGNOSIS — H90.3 SENSORINEURAL HEARING LOSS (SNHL), BILATERAL: Primary | ICD-10-CM

## 2022-09-26 DIAGNOSIS — H61.23 CERUMEN DEBRIS ON TYMPANIC MEMBRANE OF BOTH EARS: ICD-10-CM

## 2022-09-26 PROCEDURE — 92557 COMPREHENSIVE HEARING TEST: CPT | Performed by: AUDIOLOGIST

## 2022-09-26 PROCEDURE — G8427 DOCREV CUR MEDS BY ELIG CLIN: HCPCS | Performed by: OTOLARYNGOLOGY

## 2022-09-26 PROCEDURE — 99214 OFFICE O/P EST MOD 30 MIN: CPT | Performed by: OTOLARYNGOLOGY

## 2022-09-26 PROCEDURE — 1036F TOBACCO NON-USER: CPT | Performed by: OTOLARYNGOLOGY

## 2022-09-26 PROCEDURE — 3017F COLORECTAL CA SCREEN DOC REV: CPT | Performed by: OTOLARYNGOLOGY

## 2022-09-26 PROCEDURE — 69210 REMOVE IMPACTED EAR WAX UNI: CPT | Performed by: OTOLARYNGOLOGY

## 2022-09-26 PROCEDURE — 92567 TYMPANOMETRY: CPT | Performed by: AUDIOLOGIST

## 2022-09-26 PROCEDURE — G8419 CALC BMI OUT NRM PARAM NOF/U: HCPCS | Performed by: OTOLARYNGOLOGY

## 2022-09-26 NOTE — PROGRESS NOTES
This patient was referred for audiometric/tympanometric testing by Dr. Jazz Morejon due to hearing loss. Patient has had some hearing changes/concerns. Will repeat audiogram.     Patient stated he is having some left ear pain. He stated that he tried to run shower water in his ear to flush the wax out but it resulted in swimmers ear. He believes the swimmers ear is cleared up but his ear still feels tender. Fit of hearing aid dome looked good. He stated he had no issues with hearing aids/domes prior to recent swimmers ear. Do not believe he is having any hearing aid fit issues. I believe it is from residual irritation from his swimmers ear. Told him to try hearing aids for a while and all if continued discomfort. Patient received a pack of cerudisk was filters - no charge under warranty. Audiometry using pure tone air and bone conduction revealed moderately severe rising to moderate through 1000 Hz sloping to a profound mixed hearing loss bilaterally. Reliability was good. Speech reception thresholds were in good agreement with the pure tone averages, bilaterally. Speech discrimination scores were good, bilaterally. Tympanometry revealed flat tympanograms, bilaterally. The results were reviewed with the patient and physician. Recommendations for follow up will be made pending physician consult.     Karla Kumar/CCC-A  [place] Lic # [id number]

## 2023-01-03 ENCOUNTER — TELEPHONE (OUTPATIENT)
Dept: ENT CLINIC | Age: 63
End: 2023-01-03

## 2023-01-03 NOTE — TELEPHONE ENCOUNTER
Patient LVM requesting return call - states he got a new phone and needs to get his hearing aids synced to it. Please advise.     Electronically signed by Aiden Andres MA on 1/3/23 at 10:06 AM EST

## 2023-01-04 ENCOUNTER — PROCEDURE VISIT (OUTPATIENT)
Dept: AUDIOLOGY | Age: 63
End: 2023-01-04

## 2023-01-04 DIAGNOSIS — H91.93 BILATERAL HEARING LOSS, UNSPECIFIED HEARING LOSS TYPE: Primary | ICD-10-CM

## 2023-01-04 PROCEDURE — 99024 POSTOP FOLLOW-UP VISIT: CPT | Performed by: AUDIOLOGIST

## 2023-01-04 NOTE — PROGRESS NOTES
Patient came in for a hearing aid check and sync to his hearing aids to his new phone. Right speaker was not working, left speaker wax guard would not come out. Both speakers replaced with stock and new stock ordered. Patient instructed on how do download kaylee, kaylee was downloaded and patient's hearing aids synced to phone and kaylee. Patient was satisfied and will return as needed.    Electronically signed by Karla Mar on 1/4/2023 at 3:04 PM

## 2023-01-13 ENCOUNTER — PROCEDURE VISIT (OUTPATIENT)
Dept: AUDIOLOGY | Age: 63
End: 2023-01-13

## 2023-01-13 DIAGNOSIS — H90.3 SENSORINEURAL HEARING LOSS, BILATERAL: Primary | ICD-10-CM

## 2023-01-13 PROCEDURE — 99024 POSTOP FOLLOW-UP VISIT: CPT | Performed by: AUDIOLOGIST

## 2023-01-13 NOTE — PROGRESS NOTES
Patient came in for a hearing aid check. He had to do a reset on his phone an needed help syncing his hearing aids to his phone again. Hearing aids synced to phone and kaylee and working well. Patient was shown how to sync the hearing aids to the phone and the kaylee so he can try at home as needed. He will return PRN.    Electronically signed by Karla Crowe on 1/13/2023 at 10:02 AM

## 2023-02-24 ENCOUNTER — FOLLOWUP TELEPHONE ENCOUNTER (OUTPATIENT)
Dept: AUDIOLOGY | Age: 63
End: 2023-02-24

## 2023-02-24 NOTE — TELEPHONE ENCOUNTER
Patient called stating he had problem with phone and lost connection to his hearing aids. [facility] has [facility]. I could not walk him through on the phone, so I gave him the customer support number at [facility] ([phone]) so that they could walk him through it over the phone.      Electronically signed by Timothy Castellano on 2/24/2023 at 12:05 PM

## 2023-02-27 ENCOUNTER — PROCEDURE VISIT (OUTPATIENT)
Dept: AUDIOLOGY | Age: 63
End: 2023-02-27

## 2023-02-27 DIAGNOSIS — H90.3 SENSORINEURAL HEARING LOSS, BILATERAL: Primary | ICD-10-CM

## 2023-02-27 PROCEDURE — 99024 POSTOP FOLLOW-UP VISIT: CPT | Performed by: AUDIOLOGIST

## 2023-02-27 NOTE — PROGRESS NOTES
Patient came in to connect hearing aids back to his phone. Hearing aids paired through bluetooth and [employer] application. Patient was satisfied and will follow up as needed. CHRISTAL Francois.   Audiology Intern (4th Year)     Karla Rubin  Electronically signed by Karla Rubin on 2/27/2023 at 2:39 PM

## 2023-03-08 ENCOUNTER — HOSPITAL ENCOUNTER (EMERGENCY)
Age: 63
Discharge: HOME | End: 2023-03-08
Payer: MEDICARE

## 2023-03-08 VITALS
DIASTOLIC BLOOD PRESSURE: 79 MMHG | RESPIRATION RATE: 18 BRPM | OXYGEN SATURATION: 100 % | TEMPERATURE: 97.4 F | WEIGHT: 155 LBS | BODY MASS INDEX: 25.02 KG/M2 | SYSTOLIC BLOOD PRESSURE: 113 MMHG | HEART RATE: 100 BPM

## 2023-03-08 DIAGNOSIS — L03.211 FACIAL CELLULITIS: Primary | ICD-10-CM

## 2023-03-08 PROCEDURE — 99283 EMERGENCY DEPT VISIT LOW MDM: CPT

## 2023-03-08 RX ORDER — CEPHALEXIN 500 MG/1
500 CAPSULE ORAL 4 TIMES DAILY
Qty: 28 CAPSULE | Refills: 0 | Status: SHIPPED | OUTPATIENT
Start: 2023-03-08 | End: 2023-03-15

## 2023-03-08 NOTE — ED PROVIDER NOTES
Independent VALARIE Visit. HPI:  3/8/23, Time: 8:28 AM MADISYN Gan is a 60 y.o. male presenting to the ED for erythema/pain to the right periorbital area, beginning 1 day ago. The complaint has been persistent, mild in severity, and worsened by palpation of affected area. Patient reports that he noticed that area was becoming little erythematous yesterday. It was tender to palpate today therefore prompting evaluation. Denies any injury to this area. No bleeding or drainage from this area. Afebrile without recent travel or sick contacts. Patient denies all other symptoms at this time. Review of Systems:   A complete review of systems was performed and pertinent positives and negatives are stated within HPI, all other systems reviewed and are negative.          --------------------------------------------- PAST HISTORY ---------------------------------------------  Past Medical History:  has a past medical history of Cancer ([facility] [address] Diverticulitis, and [facility] Past Surgical History:  has a past surgical history that includes Spinal fusion; Colonoscopy; back surgery; laparoscopy (jan 2012); Abdomen surgery; hemicolectomy (1-5-12); and other surgical history (Right, 4-). Social History:  reports that he has never smoked. He has never used smokeless tobacco. He reports current alcohol use. He reports that he does not use drugs. Family History: family history is not on file. The patients home medications have been reviewed. Allergies: Patient has no known allergies. -------------------------------------------------- RESULTS -------------------------------------------------  All laboratory and radiology results have been personally reviewed by myself   LABS:  No results found for this visit on 03/08/23.     RADIOLOGY:  Interpreted by Radiologist.  No orders to display       ------------------------- NURSING NOTES AND VITALS REVIEWED ---------------------------   The nursing notes within the ED encounter and vital signs as below have been reviewed. /79   Pulse 100   Temp 97.4 °F (36.3 °C) (Temporal)   Resp 18   Wt 155 lb (70.3 kg)   SpO2 100%   BMI 25.02 kg/m²   Oxygen Saturation Interpretation: Normal      ---------------------------------------------------PHYSICAL EXAM--------------------------------------      Constitutional/General: Alert and oriented x3, well appearing, non toxic in NAD  Head: Normocephalic and atraumatic  Eyes: PERRL, EOMI. Mouth: Oropharynx clear, handling secretions, no trismus  Neck: Supple, full ROM,   Extremities: Moves all extremities x 4. Warm and well perfused  Skin: warm and dry. Patient has a very small area of erythema and tenderness to palpation to the nasal bridge area on the right. There is no fluctuance or drainage noted to this area. Neurologic: GCS 15,  Psych: Normal Affect      ------------------------------ ED COURSE/MEDICAL DECISION MAKING----------------------  Medications - No data to display      ED COURSE:       Medical Decision Making:    Patient is a 18-year-old male presenting to the emergency department today with concerns of erythema and pain to the right medial periorbital area/nasal area. Physical exam findings do not reveal any fluctuance or concern for abscess at this time however there is a little bit of erythema concern for early cellulitis development. 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 Patient is nontoxic-appearing, afebrile, no acute distress. Vitals are stable. At this time we will plan on outpatient management with Keflex.   Discussed return precautions with the patient. Advised follow very closely with PCP for recheck and return the emergency department with any new or worsening symptoms. Patient voiced understanding and is agreeable to the above treatment plan. Counseling: The emergency provider has spoken with the patient and discussed todays results, in addition to providing specific details for the plan of care and counseling regarding the diagnosis and prognosis. Questions are answered at this time and they are agreeable with the plan.      --------------------------------- IMPRESSION AND DISPOSITION ---------------------------------    IMPRESSION  1. Facial cellulitis        DISPOSITION  Disposition: Discharge to home  Patient condition is stable      NOTE: This report was transcribed using voice recognition software.  Every effort was made to ensure accuracy; however, inadvertent computerized transcription errors may be present       Eliseo Rincon [place]  03/08/23 1058

## 2023-03-13 ENCOUNTER — HOSPITAL ENCOUNTER (EMERGENCY)
Age: 63
Discharge: HOME | End: 2023-03-13
Payer: MEDICARE

## 2023-03-13 VITALS
TEMPERATURE: 97.6 F | SYSTOLIC BLOOD PRESSURE: 124 MMHG | RESPIRATION RATE: 16 BRPM | BODY MASS INDEX: 24.91 KG/M2 | HEART RATE: 88 BPM | OXYGEN SATURATION: 99 % | WEIGHT: 155 LBS | HEIGHT: 66 IN | DIASTOLIC BLOOD PRESSURE: 85 MMHG

## 2023-03-13 DIAGNOSIS — R04.0 EPISTAXIS: Primary | ICD-10-CM

## 2023-03-13 LAB
ANION GAP SERPL CALCULATED.3IONS-SCNC: 10 MMOL/L (ref 7–16)
BASOPHILS ABSOLUTE: 0.03 E9/L (ref 0–0.2)
BASOPHILS RELATIVE PERCENT: 0.9 % (ref 0–2)
BUN BLDV-MCNC: 20 MG/DL (ref 6–23)
CALCIUM SERPL-MCNC: 9 MG/DL (ref 8.6–10.2)
CHLORIDE BLD-SCNC: 104 MMOL/L (ref 98–107)
CO2: 26 MMOL/L (ref 22–29)
CREAT SERPL-MCNC: 1.3 MG/DL (ref 0.7–1.2)
EOSINOPHILS ABSOLUTE: 0.08 E9/L (ref 0.05–0.5)
EOSINOPHILS RELATIVE PERCENT: 2.6 % (ref 0–6)
GFR SERPL CREATININE-BSD FRML MDRD: >60 ML/MIN/1.73
GLUCOSE BLD-MCNC: 103 MG/DL (ref 74–99)
HCT VFR BLD CALC: 40.5 % (ref 37–54)
HEMOGLOBIN: 13.2 G/DL (ref 12.5–16.5)
INR BLD: 1.1
LYMPHOCYTES ABSOLUTE: 0.58 E9/L (ref 1.5–4)
LYMPHOCYTES RELATIVE PERCENT: 18.3 % (ref 20–42)
MCH RBC QN AUTO: 31.7 PG (ref 26–35)
MCHC RBC AUTO-ENTMCNC: 32.6 % (ref 32–34.5)
MCV RBC AUTO: 97.4 FL (ref 80–99.9)
MONOCYTES ABSOLUTE: 0.26 E9/L (ref 0.1–0.95)
MONOCYTES RELATIVE PERCENT: 7.8 % (ref 2–12)
NEUTROPHILS ABSOLUTE: 2.24 E9/L (ref 1.8–7.3)
NEUTROPHILS RELATIVE PERCENT: 70.4 % (ref 43–80)
OVALOCYTES: ABNORMAL
PDW BLD-RTO: 12.7 FL (ref 11.5–15)
PLATELET # BLD: 196 E9/L (ref 130–450)
PMV BLD AUTO: 10.4 FL (ref 7–12)
POIKILOCYTES: ABNORMAL
POTASSIUM SERPL-SCNC: 4.6 MMOL/L (ref 3.5–5)
PROTHROMBIN TIME: 11.9 SEC (ref 9.3–12.4)
RBC # BLD: 4.16 E12/L (ref 3.8–5.8)
SODIUM BLD-SCNC: 140 MMOL/L (ref 132–146)
VACUOLATED NEUTROPHILS: ABNORMAL
WBC # BLD: 3.2 E9/L (ref 4.5–11.5)

## 2023-03-13 PROCEDURE — 85025 COMPLETE CBC W/AUTO DIFF WBC: CPT

## 2023-03-13 PROCEDURE — 99283 EMERGENCY DEPT VISIT LOW MDM: CPT

## 2023-03-13 PROCEDURE — 6370000000 HC RX 637 (ALT 250 FOR IP): Performed by: NURSE PRACTITIONER

## 2023-03-13 PROCEDURE — 80048 BASIC METABOLIC PNL TOTAL CA: CPT

## 2023-03-13 PROCEDURE — 85610 PROTHROMBIN TIME: CPT

## 2023-03-13 PROCEDURE — 30901 CONTROL OF NOSEBLEED: CPT

## 2023-03-13 RX ORDER — TRANEXAMIC ACID 100 MG/ML
1000 INJECTION, SOLUTION INTRAVENOUS ONCE
Status: DISCONTINUED | OUTPATIENT
Start: 2023-03-13 | End: 2023-03-13 | Stop reason: HOSPADM

## 2023-03-13 RX ORDER — OXYMETAZOLINE HYDROCHLORIDE 0.05 G/100ML
2 SPRAY NASAL ONCE
Status: COMPLETED | OUTPATIENT
Start: 2023-03-13 | End: 2023-03-13

## 2023-03-13 RX ADMIN — Medication 2 SPRAY: at 16:15

## 2023-03-13 ASSESSMENT — PAIN - FUNCTIONAL ASSESSMENT: PAIN_FUNCTIONAL_ASSESSMENT: NONE - DENIES PAIN

## 2023-03-13 NOTE — ED PROVIDER NOTES
Independent VALARIE Visit. HPI:  3/13/23, Time: 2:47 PM EDT         Kina Gaviria is a 60 y.o. male presenting to the ED for nasal bleeding which started a month ago. The complaint has been intermittent, mild in severity, and worsened by nothing. Patient states that the pain has been ongoing for about a month or so. He states is worse when he bears down to have a bowel movement. He has not seen anybody for same. He states the only thing abnormal is about a week ago he was seen emergency department for sinus infection. He denied any use of aspirin or blood thinners or prior nosebleed history. Denied any recent falls or trauma. ROS:   Pertinent positives and negatives are stated within HPI, all other systems reviewed and are negative.  --------------------------------------------- PAST HISTORY ---------------------------------------------  Past Medical History:  has a past medical history of Cancer ([facility] [address] Diverticulitis, and Multiple closed pelvic fractures without disruption of pelvic ring ([facility] [address] Past Surgical History:  has a past surgical history that includes Spinal fusion; Colonoscopy; back surgery; laparoscopy (jan 2012); Abdomen surgery; hemicolectomy (1-5-12); and other surgical history (Right, 4-). Social History:  reports that he has never smoked. He has never used smokeless tobacco. He reports current alcohol use. He reports that he does not use drugs. Family History: family history is not on file. The patients home medications have been reviewed. Allergies: Patient has no known allergies.     ---------------------------------------------------PHYSICAL EXAM--------------------------------------    Constitutional/General: Alert and oriented x3, well appearing, non toxic in NAD  Head: Normocephalic and atraumatic  Eyes: PERRL, EOMI  Mouth: Oropharynx clear, handling secretions, no trismus  Neck: Supple, full ROM, non tender to palpation in the midline, no stridor, no crepitus, no meningeal signs  Pulmonary: Lungs clear to auscultation bilaterally, no wheezes, rales, or rhonchi. Not in respiratory distress  Cardiovascular:  Regular rate. Regular rhythm. No murmurs, gallops, or rubs. 2+ distal pulses  Chest: no chest wall tenderness  Abdomen: Soft. Non tender. Non distended. +BS. No rebound, guarding, or rigidity. No pulsatile masses appreciated. Musculoskeletal: Moves all extremities x 4. Warm and well perfused, no clubbing, cyanosis, or edema. Capillary refill <3 seconds  Skin: warm and dry. No rashes. Neurologic: GCS 15, CN 2-12 grossly intact, no focal deficits, symmetric strength 5/5 in the upper and lower extremities bilaterally  Psych: Normal Affect  //Patient did have posterior bleeding from his nares. There was some clots noted in the back. Was difficult to visualize but did not see any bleeding down his throat. Denied any nausea or vomiting    -------------------------------------------------- RESULTS -------------------------------------------------  I have personally reviewed all laboratory and imaging results for this patient. Results are listed below.      LABS:  Results for orders placed or performed during the hospital encounter of 03/13/23   CBC with Auto Differential   Result Value Ref Range    WBC 3.2 (L) 4.5 - 11.5 E9/L    RBC 4.16 3.80 - 5.80 E12/L    Hemoglobin 13.2 12.5 - 16.5 g/dL    Hematocrit 40.5 37.0 - 54.0 %    MCV 97.4 80.0 - 99.9 fL    MCH 31.7 26.0 - 35.0 pg    MCHC 32.6 32.0 - 34.5 %    RDW 12.7 11.5 - 15.0 fL    Platelets 360 622 - 590 E9/L    MPV 10.4 7.0 - 12.0 fL    Neutrophils % 70.4 43.0 - 80.0 %    Lymphocytes % 18.3 (L) 20.0 - 42.0 %    Monocytes % 7.8 2.0 - 12.0 %    Eosinophils % 2.6 0.0 - 6.0 %    Basophils % 0.9 0.0 - 2.0 %    Neutrophils Absolute 2.24 1.80 - 7.30 E9/L    Lymphocytes Absolute 0.58 (L) 1.50 - 4.00 E9/L    Monocytes Absolute 0.26 0.10 - 0.95 E9/L    Eosinophils Absolute 0.08 0.05 - 0.50 E9/L    Basophils Absolute 0. 03 0.00 - 0.20 E9/L    Vacuolated Neutrophils 1+     Poikilocytes 1+     Ovalocytes 1+    Basic Metabolic Panel   Result Value Ref Range    Sodium 140 132 - 146 mmol/L    Potassium 4.6 3.5 - 5.0 mmol/L    Chloride 104 98 - 107 mmol/L    CO2 26 22 - 29 mmol/L    Anion Gap 10 7 - 16 mmol/L    Glucose 103 (H) 74 - 99 mg/dL    BUN 20 6 - 23 mg/dL    Creatinine 1.3 (H) 0.7 - 1.2 mg/dL    Est, Glom Filt Rate >60 >=60 mL/min/1.73    Calcium 9.0 8.6 - 10.2 mg/dL   Protime-INR   Result Value Ref Range    Protime 11.9 9.3 - 12.4 sec    INR 1.1        RADIOLOGY:  Interpreted by Radiologist.  No orders to display         EKG Interpretation  Interpreted by emergency department physician        ------------------------- NURSING NOTES AND VITALS REVIEWED ---------------------------   The nursing notes within the ED encounter and vital signs as below have been reviewed by myself. /85   Pulse 88   Temp 97.6 °F (36.4 °C) (Temporal)   Resp 16   Ht 5' 6\" (1.676 m)   Wt 155 lb (70.3 kg)   SpO2 99%   BMI 25.02 kg/m²   Oxygen Saturation Interpretation: Normal    The patients available past medical records and past encounters were reviewed. ------------------------------ ED COURSE/MEDICAL DECISION MAKING----------------------  Medications   tranexamic acid (CYKLOKAPRON) injection 1,000 mg (1,000 mg Topical Not Given 3/13/23 1721)   oxymetazoline (AFRIN) 0.05 % nasal spray 2 spray (2 sprays Each Nostril Given 3/13/23 1615)             Medical Decision Making:    Emilio Shone is a 60 y.o. male presenting to the ED for nasal bleeding which started a month ago. The complaint has been intermittent, mild in severity, and worsened by nothing. Patient states that the pain has been ongoing for about a month or so. He states is worse when he bears down to have a bowel movement. He has not seen anybody for same.   He states the only thing abnormal is about a week ago he was seen emergency department for sinus infection. He denied any use of aspirin or blood thinners or prior nosebleed history. Denied any recent falls or trauma. Lab work completed emergency department was stable. Patient did have placement of Afrin cotton pledgets in his nares with good results. Patient had no further bleeding. He was instructed follow-up primary care doctor as he may need to see ENT. He does use a lot of nasal sprays this certainly could contribute to the reason for him having nasal bleeds. He was recommended to use saline nasal spray for comfort. Re-Evaluations:             Re-evaluation. Patients symptoms are improving. Nasal Packing  Patient was instructed to blow out all clots and debris from his nostrils. Afrin was then placed in both nostrils along with cotton pledgets. Patient then was monitored in the ED for approximately 45 to 1-hour and had no further return of the bleeding. Does not appear the blood is dripping behind patient's throat but it is difficult to visualize any. He was instructed to call his doctor tomorrow regarding same. Patient instructed not to pick nose or blow nose excessively to cause further bleeding and trauma. Patient does use a lot of nasal spray so this could definitely be a reason for the bleeding. This patient's ED course included: a personal history and physicial examination and a personal history and physicial eaxmination    This patient has remained hemodynamically stable during their ED course. Counseling: The emergency provider has spoken with the patient and discussed todays results, in addition to providing specific details for the plan of care and counseling regarding the diagnosis and prognosis.   Questions are answered at this time and they are agreeable with the plan.       --------------------------------- IMPRESSION AND DISPOSITION ---------------------------------    IMPRESSION  1. Epistaxis        DISPOSITION  Disposition: Discharge to home  Patient condition is good        NOTE: This report was transcribed using voice recognition software.  Every effort was made to ensure accuracy; however, inadvertent computerized transcription errors may be present          EARNEST Little - YANG  03/13/23 9909

## 2023-03-13 NOTE — ED NOTES
FIRST PROVIDER CONTACT ASSESSMENT NOTE       Department of Emergency Medicine                 First Provider Note            3/13/23  1:23 PM EDT    Date of Encounter: No admission date for patient encounter. Patient Name: Marda Oppenheim  : 1960  MRN: [id number]    Chief Complaint: No chief complaint on file. History of Present Illness:   Marda Oppenheim is a 60 y.o. male who presents to the ED for nosebleed, intermittent, denies injury. Focused Physical Exam:  VS:    ED Triage Vitals [23 1235]   BP Temp Temp Source Heart Rate Resp SpO2 Height Weight   -- 97.6 °F (36.4 °C) Temporal 88 -- 99 % -- --        Physical Ex: Constitutional: Alert and non-toxic. Medical History:  has a past medical history of Cancer [facility]), Diverticulitis, and Multiple closed pelvic fractures without disruption of pelvic ring ([facility] [address] Surgical History:  has a past surgical history that includes Spinal fusion; Colonoscopy; back surgery; laparoscopy (2012); Abdomen surgery; hemicolectomy (12); and other surgical history (Right, 2016). Social History:  reports that he has never smoked. He has never used smokeless tobacco. He reports current alcohol use. He reports that he does not use drugs. Family History: family history is not on file. Allergies: Patient has no known allergies.      Initial Plan of Care: Initiate Treatment-Testing, Proceed toTreatment Area When Bed Available for ED Attending/MLP to Continue Care      ---END OF FIRST PROVIDER CONTACT ASSESSMENT NOTE---  Electronically signed by BETHEL Gibbons   DD: 3/13/23       BETHEL Gibbons  [id number]

## 2023-03-28 ENCOUNTER — OFFICE VISIT (OUTPATIENT)
Dept: FAMILY MEDICINE CLINIC | Age: 63
End: 2023-03-28
Payer: MEDICARE

## 2023-03-28 VITALS
OXYGEN SATURATION: 97 % | BODY MASS INDEX: 26.92 KG/M2 | HEART RATE: 84 BPM | DIASTOLIC BLOOD PRESSURE: 72 MMHG | SYSTOLIC BLOOD PRESSURE: 126 MMHG | HEIGHT: 66 IN | WEIGHT: 167.5 LBS | TEMPERATURE: 97.2 F | RESPIRATION RATE: 16 BRPM

## 2023-03-28 DIAGNOSIS — J34.89 MASS OF NOSE: ICD-10-CM

## 2023-03-28 DIAGNOSIS — C69.51: Primary | ICD-10-CM

## 2023-03-28 DIAGNOSIS — C79.9 METASTATIC ADENOCARCINOMA (HCC): ICD-10-CM

## 2023-03-28 DIAGNOSIS — M54.2 CERVICALGIA: ICD-10-CM

## 2023-03-28 PROCEDURE — 99214 OFFICE O/P EST MOD 30 MIN: CPT | Performed by: NURSE PRACTITIONER

## 2023-03-28 RX ORDER — GABAPENTIN 300 MG/1
CAPSULE ORAL
Qty: 90 CAPSULE | Status: CANCELLED | OUTPATIENT
Start: 2023-03-28

## 2023-03-28 RX ORDER — SODIUM FLUORIDE 5 MG/G
1 GEL, DENTIFRICE DENTAL NIGHTLY
COMMUNITY
Start: 2023-01-16 | End: 2023-07-15

## 2023-03-28 RX ORDER — GABAPENTIN 300 MG/1
300 CAPSULE ORAL 2 TIMES DAILY
Qty: 60 CAPSULE | Refills: 0 | Status: SHIPPED | OUTPATIENT
Start: 2023-03-28 | End: 2023-04-27

## 2023-03-28 SDOH — ECONOMIC STABILITY: INCOME INSECURITY: HOW HARD IS IT FOR YOU TO PAY FOR THE VERY BASICS LIKE FOOD, HOUSING, MEDICAL CARE, AND HEATING?: NOT HARD AT ALL

## 2023-03-28 SDOH — ECONOMIC STABILITY: FOOD INSECURITY: WITHIN THE PAST 12 MONTHS, YOU WORRIED THAT YOUR FOOD WOULD RUN OUT BEFORE YOU GOT MONEY TO BUY MORE.: NEVER TRUE

## 2023-03-28 SDOH — ECONOMIC STABILITY: FOOD INSECURITY: WITHIN THE PAST 12 MONTHS, THE FOOD YOU BOUGHT JUST DIDN'T LAST AND YOU DIDN'T HAVE MONEY TO GET MORE.: NEVER TRUE

## 2023-03-28 SDOH — ECONOMIC STABILITY: HOUSING INSECURITY
IN THE LAST 12 MONTHS, WAS THERE A TIME WHEN YOU DID NOT HAVE A STEADY PLACE TO SLEEP OR SLEPT IN A SHELTER (INCLUDING NOW)?: NO

## 2023-03-28 ASSESSMENT — ENCOUNTER SYMPTOMS
WHEEZING: 0
CONSTIPATION: 0
VOMITING: 0
SHORTNESS OF BREATH: 0
NAUSEA: 0
DIARRHEA: 0
COUGH: 0

## 2023-03-28 ASSESSMENT — PATIENT HEALTH QUESTIONNAIRE - PHQ9
SUM OF ALL RESPONSES TO PHQ QUESTIONS 1-9: 0
1. LITTLE INTEREST OR PLEASURE IN DOING THINGS: 0
SUM OF ALL RESPONSES TO PHQ9 QUESTIONS 1 & 2: 0
2. FEELING DOWN, DEPRESSED OR HOPELESS: 0

## 2023-03-28 NOTE — PROGRESS NOTES
change, appetite change, chills, diaphoresis, fever and unexpected weight change. Respiratory:  Negative for cough, shortness of breath and wheezing. Cardiovascular:  Negative for chest pain and palpitations. Gastrointestinal:  Negative for constipation, diarrhea, nausea and vomiting. Genitourinary:  Negative for difficulty urinating. Neurological:  Negative for dizziness, weakness and headaches. Psychiatric/Behavioral:  Negative for dysphoric mood and sleep disturbance. The patient is not nervous/anxious. Objective   Physical Exam  Constitutional:       Appearance: He is well-developed. HENT:      Head: Normocephalic and atraumatic. Neck:      Thyroid: No thyromegaly. Trachea: No tracheal deviation. Cardiovascular:      Rate and Rhythm: Normal rate and regular rhythm. Heart sounds: No murmur heard. Pulmonary:      Effort: Pulmonary effort is normal. No respiratory distress. Breath sounds: Normal breath sounds. Abdominal:      General: Bowel sounds are normal.      Palpations: Abdomen is soft. Tenderness: There is no abdominal tenderness. Musculoskeletal:         General: Normal range of motion. Lymphadenopathy:      Cervical: No cervical adenopathy. Skin:     General: Skin is warm and dry. Findings: Lesion (to right side of nose, telangiectasia noted) present. Neurological:      Mental Status: He is alert and oriented to person, place, and time. Psychiatric:         Behavior: Behavior normal.            [facility] Mod      An electronic signature was used to authenticate this note.     --EARNEST Mills - CNP

## 2023-04-18 ENCOUNTER — HOSPITAL ENCOUNTER (OUTPATIENT)
Dept: AUDIOLOGY | Age: 63
Discharge: HOME | End: 2023-04-18

## 2023-04-18 PROCEDURE — 9990000010 HC NO CHARGE VISIT: Performed by: AUDIOLOGIST

## 2023-04-18 NOTE — PROGRESS NOTES
Patient in to  left hearing aid repair. Got new phone and paired to new phone. Returned [employer]. Return as needed.      Esha Dean M.A., [address] [facility] [id number]  Electronically signed by Nash Silva on 4/18/2023 at 3:02 PM

## 2023-04-24 ENCOUNTER — PROCEDURE VISIT (OUTPATIENT)
Dept: AUDIOLOGY | Age: 63
End: 2023-04-24
Payer: MEDICARE

## 2023-04-24 ENCOUNTER — OFFICE VISIT (OUTPATIENT)
Dept: ENT CLINIC | Age: 63
End: 2023-04-24
Payer: MEDICARE

## 2023-04-24 VITALS — HEIGHT: 66 IN | WEIGHT: 167 LBS | BODY MASS INDEX: 26.84 KG/M2

## 2023-04-24 DIAGNOSIS — H93.13 TINNITUS OF BOTH EARS: ICD-10-CM

## 2023-04-24 DIAGNOSIS — H90.6 MIXED CONDUCTIVE AND SENSORINEURAL HEARING LOSS OF BOTH EARS: Primary | ICD-10-CM

## 2023-04-24 DIAGNOSIS — H61.23 CERUMEN DEBRIS ON TYMPANIC MEMBRANE OF BOTH EARS: ICD-10-CM

## 2023-04-24 DIAGNOSIS — H90.3 SENSORINEURAL HEARING LOSS (SNHL), BILATERAL: Primary | ICD-10-CM

## 2023-04-24 DIAGNOSIS — C69.51: ICD-10-CM

## 2023-04-24 PROCEDURE — 92553 AUDIOMETRY AIR & BONE: CPT | Performed by: AUDIOLOGIST

## 2023-04-24 PROCEDURE — 99214 OFFICE O/P EST MOD 30 MIN: CPT | Performed by: OTOLARYNGOLOGY

## 2023-04-24 PROCEDURE — 69210 REMOVE IMPACTED EAR WAX UNI: CPT | Performed by: OTOLARYNGOLOGY

## 2023-04-24 PROCEDURE — 92567 TYMPANOMETRY: CPT | Performed by: AUDIOLOGIST

## 2023-04-24 NOTE — PROGRESS NOTES
This patient was referred for audiometric/tympanometric testing by Dr. Lion Davis due to increase tinnitus bilaterally. Tinnitus worsening in the next few weeks. Audiometry using pure tone air and bone conduction revealed essentially moderate through 1000 Hz sloping to profound mixed hearing loss bilaterally. Reliability was good. Tympanometry revealed flat tympanograms, bilaterally. The results were reviewed with the patient and physician. Recommendations for follow up will be made pending physician consult.     Karla De La Cruz/CCC-A  [place] Lic # [id number]

## 2023-06-08 ENCOUNTER — FOLLOWUP TELEPHONE ENCOUNTER (OUTPATIENT)
Dept: AUDIOLOGY | Age: 63
End: 2023-06-08

## 2023-06-08 NOTE — TELEPHONE ENCOUNTER
Patient left message stating having problem with HA and phone. Called back and he states he got it working,     Asked him to call if he has any further issues.      Electronically signed by Judi Escobar on 6/8/2023 at 3:51 PM

## 2023-09-05 ENCOUNTER — HOSPITAL ENCOUNTER (OUTPATIENT)
Age: 63
Discharge: HOME | End: 2023-09-07

## 2023-09-05 PROCEDURE — 88305 TISSUE EXAM BY PATHOLOGIST: CPT

## 2023-09-07 ENCOUNTER — HOSPITAL ENCOUNTER (OUTPATIENT)
Dept: AUDIOLOGY | Age: 63
Discharge: HOME | End: 2023-09-07

## 2023-09-07 LAB — SURGICAL PATHOLOGY REPORT: NORMAL

## 2023-09-07 PROCEDURE — 9990000010 HC NO CHARGE VISIT: Performed by: AUDIOLOGIST

## 2023-09-07 NOTE — PROGRESS NOTES
Patient complains of left hearing aid not loud enough and difficulty with using phone with bluetooth calls, and difficulty with phone disconnecting to Bluetooth intermittently. Both hearing aids changed from 90% to 100% adaptation. Updated audiogram. Increased left gain. Re-ran feedback manager and overtuned to max gain without feedback. He states that people cannot hear him when he is on the phone, but he can hear them. Explained that he still needs to use the phone microphone or else they will not be able to hear him. Re-paired phones. He will call if above remedies are not helpful.      Bao Muñiz M.A., [address] [facility] [id number]  Electronically signed by Trever Dumont on 9/7/2023 at 2:03 PM

## 2023-09-18 ENCOUNTER — TELEPHONE (OUTPATIENT)
Dept: AUDIOLOGY | Age: 63
End: 2023-09-18

## 2023-09-18 ENCOUNTER — TELEPHONE (OUTPATIENT)
Dept: ENT CLINIC | Age: 63
End: 2023-09-18

## 2023-09-18 NOTE — TELEPHONE ENCOUNTER
Patient called and left hearing aid is not working.        Called and scheduled for 9/20/23  930 am     Confirmed with patient at [address] and [address]  ( we moved across the garcia)

## 2023-09-18 NOTE — TELEPHONE ENCOUNTER
Patient left message he is having trouble with his hearing aids. Called patient and got his vociemail and left message to call us back for scheduling an appointment to check hearing aids.

## 2023-09-18 NOTE — TELEPHONE ENCOUNTER
Patient called into office stating that he is having issues with his hearing aid and would like to speak with Audiology. I advised patient that they were unavailable at this time, but transferred him to their line to leave a voicemail.   Electronically signed by Renetta Morales LPN on 7/70/3168 at 9:94 AM

## 2023-09-20 ENCOUNTER — PROCEDURE VISIT (OUTPATIENT)
Dept: AUDIOLOGY | Age: 63
End: 2023-09-20

## 2023-09-20 DIAGNOSIS — H90.3 SENSORINEURAL HEARING LOSS, BILATERAL: Primary | ICD-10-CM

## 2023-09-20 NOTE — PROGRESS NOTES
Patient seen for an under warranty no charge hearing aid check. Left hearing aid working intermittently. Listening check revealed a weak left reciever. Replaced with a new stock M 4.0 1L . Will call and order stock replacement under his warranty - discussed with patient. Patient pleased with fit and sound. Asked for pack of wax filters - given to patient. Discussed calling end of 2024 to start process of increasing warranty before it expires 3/2025     Follow up as needed.          Rashaad Iraheta, Karla/CCC-A  [employer] Lic # [id number]

## 2023-09-25 ENCOUNTER — TELEPHONE (OUTPATIENT)
Dept: ENT CLINIC | Age: 63
End: 2023-09-25

## 2023-10-27 ENCOUNTER — PROCEDURE VISIT (OUTPATIENT)
Dept: AUDIOLOGY | Age: 63
End: 2023-10-27

## 2023-10-27 DIAGNOSIS — H90.3 SENSORINEURAL HEARING LOSS, BILATERAL: Primary | ICD-10-CM

## 2023-10-27 PROCEDURE — 99024 POSTOP FOLLOW-UP VISIT: CPT | Performed by: AUDIOLOGIST

## 2023-10-27 NOTE — PROGRESS NOTES
HA check. The patient came in for a hearing aid check. His left hearing aid was not working. Troubleshooting revealed that the speaker was the part that was not working. Speaker replaced with stock and a new stock to be ordered under the patient's serial number for [facility] audiology. Order number: [id number]. Counseled patient on keeping the hearing aids dry to prevent moisture damage. No other issues noted the patient was satisfied and will return as needed.      Electronically signed by Karla Varela on 10/27/2023 at 10:50 AM

## 2023-10-31 ENCOUNTER — PROCEDURE VISIT (OUTPATIENT)
Dept: AUDIOLOGY | Age: 63
End: 2023-10-31

## 2023-10-31 DIAGNOSIS — H90.3 SENSORINEURAL HEARING LOSS, BILATERAL: Primary | ICD-10-CM

## 2023-10-31 PROCEDURE — 99024 POSTOP FOLLOW-UP VISIT: CPT | Performed by: AUDIOLOGIST

## 2023-10-31 NOTE — PROGRESS NOTES
Patient was here for hearing aid check. He reported no sound from left hearing aid, green light not turning off. Reset hearing aid, held down button and put in . Downloaded new firmware for both hearing aids. Patient was satisfied and will follow up as needed.        Karla Duggan [facility]  [address] [place], [place] [id number] Electronically signed by Karla Duggan on 10/31/2023 at 4:38 PM

## 2024-04-25 ENCOUNTER — TELEPHONE (OUTPATIENT)
Dept: ENT CLINIC | Age: 64
End: 2024-04-25

## 2024-04-25 NOTE — TELEPHONE ENCOUNTER
Patient called and left a detailed voicemail in regards to having trouble with hearing aids please return call at [phone].

## 2024-04-26 ENCOUNTER — HOSPITAL ENCOUNTER (OUTPATIENT)
Dept: AUDIOLOGY | Age: 64
Discharge: HOME | End: 2024-04-26

## 2024-04-26 PROCEDURE — 9990000010 HC NO CHARGE VISIT: Performed by: AUDIOLOGIST

## 2024-04-26 NOTE — PROGRESS NOTES
Needed help pairing hearing aids to new phone. Yesterday gave him phone number for the wrong .     Paired to the kaylee without difficulty. However, he could not hear on phone. Called [employer] support. Made some changes.     His Bluetooth was not recognizing hearing aids. Reconnected hearing aids through the BT and phone was working.     Gave him [employer] card with customer support.     Gwen Monzon M.A., CCC/A  [place] Lic [id number]  Electronically signed by Karla Robbins on 4/26/2024 at 9:44 AM

## 2024-08-06 ENCOUNTER — PROCEDURE VISIT (OUTPATIENT)
Dept: AUDIOLOGY | Age: 64
End: 2024-08-06

## 2024-08-06 DIAGNOSIS — H90.3 SENSORINEURAL HEARING LOSS, BILATERAL: Primary | ICD-10-CM

## 2024-08-06 PROCEDURE — 99024 POSTOP FOLLOW-UP VISIT: CPT | Performed by: AUDIOLOGIST

## 2024-08-06 NOTE — PROGRESS NOTES
The patient came in for a hearing aid check.  His left hearing aid was not working. Left speaker replaced and the hearing aid was working well.  Domes and wax guards replaced.  New domes and wax guards given to the patient.  A replacement stock speaker was ordered as well as replacement domes and wax guards.  The patient was satisfied and will return as needed. Order number [id number]  Electronically signed by Karla Ryan on 8/6/2024 at 3:45 PM

## 2024-08-20 ENCOUNTER — PROCEDURE VISIT (OUTPATIENT)
Dept: AUDIOLOGY | Age: 64
End: 2024-08-20

## 2024-08-20 DIAGNOSIS — H90.3 SENSORINEURAL HEARING LOSS, BILATERAL: Primary | ICD-10-CM

## 2024-08-20 PROCEDURE — 99024 POSTOP FOLLOW-UP VISIT: CPT | Performed by: AUDIOLOGIST

## 2024-08-20 NOTE — PROGRESS NOTES
Patient came in reporting right hearing aid not working. Otoscopy revealed minimal cerumen, bilaterally. Right hearing aid was missing dome and was completely dead. Changed the wax guard and added new dome. Charged right hearing aid for a few minutes and gave back to patient. Patient will call if problems arise.   Electronically signed by Karla Ryan on 8/20/2024 at 3:59 PM     The patient is a 67y Male complaining of left 2nd toe infection

## 2024-09-11 ENCOUNTER — TELEPHONE (OUTPATIENT)
Dept: FAMILY MEDICINE CLINIC | Age: 64
End: 2024-09-11

## 2024-09-23 ENCOUNTER — TELEPHONE (OUTPATIENT)
Dept: AUDIOLOGY | Age: 64
End: 2024-09-23

## 2024-09-23 ENCOUNTER — PROCEDURE VISIT (OUTPATIENT)
Dept: AUDIOLOGY | Age: 64
End: 2024-09-23

## 2024-09-23 DIAGNOSIS — H90.3 SENSORINEURAL HEARING LOSS, BILATERAL: Primary | ICD-10-CM

## 2024-09-23 PROCEDURE — 99024 POSTOP FOLLOW-UP VISIT: CPT

## 2024-09-25 ENCOUNTER — OFFICE VISIT (OUTPATIENT)
Dept: FAMILY MEDICINE CLINIC | Age: 64
End: 2024-09-25
Payer: MEDICARE

## 2024-09-25 VITALS
HEART RATE: 90 BPM | HEIGHT: 66 IN | DIASTOLIC BLOOD PRESSURE: 64 MMHG | SYSTOLIC BLOOD PRESSURE: 92 MMHG | TEMPERATURE: 98.3 F | WEIGHT: 144.7 LBS | RESPIRATION RATE: 16 BRPM | OXYGEN SATURATION: 93 % | BODY MASS INDEX: 23.25 KG/M2

## 2024-09-25 DIAGNOSIS — C80.1 NEUROPATHY ASSOCIATED WITH CANCER (HCC): ICD-10-CM

## 2024-09-25 DIAGNOSIS — S43.421A SPRAIN OF RIGHT ROTATOR CUFF CAPSULE, INITIAL ENCOUNTER: ICD-10-CM

## 2024-09-25 DIAGNOSIS — F33.1 MODERATE EPISODE OF RECURRENT MAJOR DEPRESSIVE DISORDER (HCC): ICD-10-CM

## 2024-09-25 DIAGNOSIS — Z71.89 ACP (ADVANCE CARE PLANNING): ICD-10-CM

## 2024-09-25 DIAGNOSIS — G63 NEUROPATHY ASSOCIATED WITH CANCER (HCC): ICD-10-CM

## 2024-09-25 DIAGNOSIS — C69.51: ICD-10-CM

## 2024-09-25 DIAGNOSIS — Z00.00 INITIAL MEDICARE ANNUAL WELLNESS VISIT: Primary | ICD-10-CM

## 2024-09-25 DIAGNOSIS — Z00.00 ENCOUNTER FOR ANNUAL WELLNESS VISIT (AWV) IN MEDICARE PATIENT: ICD-10-CM

## 2024-09-25 PROCEDURE — G0438 PPPS, INITIAL VISIT: HCPCS | Performed by: FAMILY MEDICINE

## 2024-09-25 PROCEDURE — 1123F ACP DISCUSS/DSCN MKR DOCD: CPT | Performed by: FAMILY MEDICINE

## 2024-09-25 RX ORDER — LEVOTHYROXINE SODIUM 75 UG/1
TABLET ORAL
COMMUNITY

## 2024-09-25 RX ORDER — MIDODRINE HYDROCHLORIDE 10 MG/1
10 TABLET ORAL 3 TIMES DAILY
COMMUNITY
Start: 2024-07-22

## 2024-09-25 RX ORDER — BENZONATATE 100 MG/1
100 CAPSULE ORAL 3 TIMES DAILY
COMMUNITY
Start: 2024-09-11 | End: 2024-12-10

## 2024-09-25 SDOH — ECONOMIC STABILITY: FOOD INSECURITY: WITHIN THE PAST 12 MONTHS, YOU WORRIED THAT YOUR FOOD WOULD RUN OUT BEFORE YOU GOT MONEY TO BUY MORE.: NEVER TRUE

## 2024-09-25 SDOH — ECONOMIC STABILITY: INCOME INSECURITY: HOW HARD IS IT FOR YOU TO PAY FOR THE VERY BASICS LIKE FOOD, HOUSING, MEDICAL CARE, AND HEATING?: NOT HARD AT ALL

## 2024-09-25 SDOH — ECONOMIC STABILITY: FOOD INSECURITY: WITHIN THE PAST 12 MONTHS, THE FOOD YOU BOUGHT JUST DIDN'T LAST AND YOU DIDN'T HAVE MONEY TO GET MORE.: NEVER TRUE

## 2024-09-25 ASSESSMENT — COLUMBIA-SUICIDE SEVERITY RATING SCALE - C-SSRS
6. HAVE YOU EVER DONE ANYTHING, STARTED TO DO ANYTHING, OR PREPARED TO DO ANYTHING TO END YOUR LIFE?: YES
7. DID THIS OCCUR IN THE LAST THREE MONTHS: YES
2. HAVE YOU ACTUALLY HAD ANY THOUGHTS OF KILLING YOURSELF?: NO
1. WITHIN THE PAST MONTH, HAVE YOU WISHED YOU WERE DEAD OR WISHED YOU COULD GO TO SLEEP AND NOT WAKE UP?: NO

## 2024-09-25 ASSESSMENT — PATIENT HEALTH QUESTIONNAIRE - PHQ9
10. IF YOU CHECKED OFF ANY PROBLEMS, HOW DIFFICULT HAVE THESE PROBLEMS MADE IT FOR YOU TO DO YOUR WORK, TAKE CARE OF THINGS AT HOME, OR GET ALONG WITH OTHER PEOPLE: NOT DIFFICULT AT ALL
SUM OF ALL RESPONSES TO PHQ9 QUESTIONS 1 & 2: 4
SUM OF ALL RESPONSES TO PHQ QUESTIONS 1-9: 6
3. TROUBLE FALLING OR STAYING ASLEEP: NOT AT ALL
4. FEELING TIRED OR HAVING LITTLE ENERGY: NOT AT ALL
9. THOUGHTS THAT YOU WOULD BE BETTER OFF DEAD, OR OF HURTING YOURSELF: MORE THAN HALF THE DAYS
1. LITTLE INTEREST OR PLEASURE IN DOING THINGS: NEARLY EVERY DAY
2. FEELING DOWN, DEPRESSED OR HOPELESS: SEVERAL DAYS
5. POOR APPETITE OR OVEREATING: NOT AT ALL
8. MOVING OR SPEAKING SO SLOWLY THAT OTHER PEOPLE COULD HAVE NOTICED. OR THE OPPOSITE, BEING SO FIGETY OR RESTLESS THAT YOU HAVE BEEN MOVING AROUND A LOT MORE THAN USUAL: NOT AT ALL
7. TROUBLE CONCENTRATING ON THINGS, SUCH AS READING THE NEWSPAPER OR WATCHING TELEVISION: NOT AT ALL
SUM OF ALL RESPONSES TO PHQ QUESTIONS 1-9: 4
SUM OF ALL RESPONSES TO PHQ QUESTIONS 1-9: 6
6. FEELING BAD ABOUT YOURSELF - OR THAT YOU ARE A FAILURE OR HAVE LET YOURSELF OR YOUR FAMILY DOWN: NOT AT ALL
SUM OF ALL RESPONSES TO PHQ QUESTIONS 1-9: 6

## 2024-09-25 ASSESSMENT — LIFESTYLE VARIABLES
HOW MANY STANDARD DRINKS CONTAINING ALCOHOL DO YOU HAVE ON A TYPICAL DAY: PATIENT DOES NOT DRINK
HOW OFTEN DO YOU HAVE A DRINK CONTAINING ALCOHOL: NEVER

## 2024-09-30 ENCOUNTER — TELEPHONE (OUTPATIENT)
Dept: AUDIOLOGY | Age: 64
End: 2024-09-30

## 2024-09-30 NOTE — TELEPHONE ENCOUNTER
Patient called and left message and he has a new phone and needs it paired to hearing aids.     Called patient and left message to see if he can come tomorrow 10/01 at 1130 am asked him to call back and leave a message letting us know.

## 2024-10-01 ENCOUNTER — PROCEDURE VISIT (OUTPATIENT)
Dept: AUDIOLOGY | Age: 64
End: 2024-10-01

## 2024-10-01 ENCOUNTER — TELEPHONE (OUTPATIENT)
Dept: AUDIOLOGY | Age: 64
End: 2024-10-01

## 2024-10-01 DIAGNOSIS — H90.3 SENSORINEURAL HEARING LOSS, BILATERAL: Primary | ICD-10-CM

## 2024-10-01 PROCEDURE — 99024 POSTOP FOLLOW-UP VISIT: CPT

## 2024-10-01 NOTE — TELEPHONE ENCOUNTER
Patient called to report he got a new phone to be synced to his hearing aids. Patient is already on schedule at 1130 today, appointment made yesterday. Patient stated that he will be at his appointment.     Electronically signed by Karla Bose on 10/1/2024 at 9:07 AM

## 2024-10-01 NOTE — PROGRESS NOTES
Patient was seen to pair hearing aids to new phone. Paired hearing aids to phone and kaylee and verified use in office. Patient was satisfied and will return for services PRN.    Electronically signed by Karla Queen on 10/1/2024 at 11:42 AM

## 2024-12-20 ENCOUNTER — HOSPITAL ENCOUNTER (OUTPATIENT)
Dept: AUDIOLOGY | Age: 64
Discharge: HOME | End: 2024-12-20

## 2024-12-20 NOTE — PROGRESS NOTES
Patient cancelled due to weather. He will call back to reschedule.     Electronically signed by Karla Robbins on 12/20/2024 at 10:24 AM

## 2024-12-27 ENCOUNTER — TELEPHONE (OUTPATIENT)
Dept: AUDIOLOGY | Age: 64
End: 2024-12-27

## 2024-12-27 NOTE — TELEPHONE ENCOUNTER
Patient called and left message due to issue with hearing aid speaker being broken. Called patient back and left message to call us for scheduling

## 2024-12-30 ENCOUNTER — HOSPITAL ENCOUNTER (OUTPATIENT)
Dept: AUDIOLOGY | Age: 64
Discharge: HOME | End: 2024-12-30

## 2024-12-30 PROCEDURE — 9990000010 HC NO CHARGE VISIT: Performed by: AUDIOLOGIST

## 2024-12-30 NOTE — PROGRESS NOTES
Patient here for HA check. Complains of left is intermittent.     Left  plugged with wax and is deeply embedded in speaker: cannot change wax filter. Therefore, speaker replaced under warranty.     Replacement speaker ordered and to be returned to stock.     HA working well.     Return as needed.     Gwen Monzon M.A., CCC/A  [place] Lic [id number]  Electronically signed by Karla Robbins on 12/30/2024 at 10:49 AM

## 2025-01-03 ENCOUNTER — PROCEDURE VISIT (OUTPATIENT)
Dept: AUDIOLOGY | Age: 65
End: 2025-01-03

## 2025-01-03 ENCOUNTER — TELEPHONE (OUTPATIENT)
Dept: AUDIOLOGY | Age: 65
End: 2025-01-03

## 2025-01-03 DIAGNOSIS — H90.3 SENSORINEURAL HEARING LOSS, BILATERAL: Primary | ICD-10-CM

## 2025-01-03 PROCEDURE — 99024 POSTOP FOLLOW-UP VISIT: CPT

## 2025-01-03 NOTE — PROGRESS NOTES
Patient was seen to replace right hearing aid .  had snapped off where the  and device meet. Changed right  with one from stock. Ordered replacement receivers for stock under warranty. Will return for services PRN.    Electronically signed by Karla Queen on 1/3/2025 at 11:35 AM

## 2025-01-03 NOTE — TELEPHONE ENCOUNTER
Patient called and reported  wire fell out of hearing aid. Requested to be seen ASAP. Scheduled for today before 12 PM    Electronically signed by Karla Queen on 1/3/2025 at 10:12 AM

## 2025-07-16 ENCOUNTER — TELEPHONE (OUTPATIENT)
Dept: AUDIOLOGY | Age: 65
End: 2025-07-16

## 2025-07-16 NOTE — TELEPHONE ENCOUNTER
Patient called [facility] to schedule [facility]AC to have hearing aids repaired to newest cell phone. Called patient and scheduled appointment for 7/24/25 at 1PM at [facility].    Electronically signed by Karla Queen on 7/16/2025 at 10:38 AM

## 2025-07-24 ENCOUNTER — PROCEDURE VISIT (OUTPATIENT)
Dept: AUDIOLOGY | Age: 65
End: 2025-07-24

## 2025-07-24 DIAGNOSIS — H90.3 SENSORINEURAL HEARING LOSS, BILATERAL: Primary | ICD-10-CM

## 2025-07-24 NOTE — PROGRESS NOTES
Patient came in due to getting a new phone and wanting to connect phone to hearing aids. Hearing aids paired and confirmed on device. Patient made a phone call to make sure they worked. Patient satisfied and will call if any problems arise.     Electronically signed by Karla Bose on 7/24/2025 at 1:50 PM